# Patient Record
Sex: FEMALE | Race: BLACK OR AFRICAN AMERICAN | Employment: OTHER | URBAN - METROPOLITAN AREA
[De-identification: names, ages, dates, MRNs, and addresses within clinical notes are randomized per-mention and may not be internally consistent; named-entity substitution may affect disease eponyms.]

---

## 2017-08-03 ENCOUNTER — OFFICE VISIT (OUTPATIENT)
Dept: PRIMARY CARE CLINIC | Age: 62
End: 2017-08-03

## 2017-08-03 VITALS
TEMPERATURE: 98.3 F | HEART RATE: 65 BPM | BODY MASS INDEX: 24.44 KG/M2 | OXYGEN SATURATION: 100 % | WEIGHT: 165 LBS | SYSTOLIC BLOOD PRESSURE: 122 MMHG | HEIGHT: 69 IN | DIASTOLIC BLOOD PRESSURE: 74 MMHG

## 2017-08-03 DIAGNOSIS — R60.0 BILATERAL EDEMA OF LOWER EXTREMITY: Primary | ICD-10-CM

## 2017-08-03 DIAGNOSIS — R06.01 ORTHOPNEA: ICD-10-CM

## 2017-08-03 PROCEDURE — 99213 OFFICE O/P EST LOW 20 MIN: CPT | Performed by: NURSE PRACTITIONER

## 2017-08-03 RX ORDER — POTASSIUM CHLORIDE 750 MG/1
10 TABLET, EXTENDED RELEASE ORAL DAILY
Qty: 60 TABLET | Refills: 3 | Status: SHIPPED | OUTPATIENT
Start: 2017-08-03 | End: 2019-04-24

## 2017-08-03 RX ORDER — FUROSEMIDE 20 MG/1
40 TABLET ORAL SEE ADMIN INSTRUCTIONS
Qty: 60 TABLET | Refills: 1 | Status: SHIPPED | OUTPATIENT
Start: 2017-08-03 | End: 2019-04-24

## 2017-08-03 ASSESSMENT — ENCOUNTER SYMPTOMS
VOMITING: 0
WHEEZING: 0
COUGH: 0
ANAL BLEEDING: 0
STRIDOR: 0
DIARRHEA: 0
COLOR CHANGE: 0
RECTAL PAIN: 0
CHOKING: 0
ABDOMINAL PAIN: 0
CHEST TIGHTNESS: 0
APNEA: 0
CONSTIPATION: 0
ABDOMINAL DISTENTION: 0
BLOOD IN STOOL: 0
NAUSEA: 0

## 2017-08-03 ASSESSMENT — PATIENT HEALTH QUESTIONNAIRE - PHQ9
SUM OF ALL RESPONSES TO PHQ QUESTIONS 1-9: 0
2. FEELING DOWN, DEPRESSED OR HOPELESS: 0
1. LITTLE INTEREST OR PLEASURE IN DOING THINGS: 0
SUM OF ALL RESPONSES TO PHQ9 QUESTIONS 1 & 2: 0

## 2017-12-05 DIAGNOSIS — I10 ESSENTIAL HYPERTENSION: ICD-10-CM

## 2017-12-06 RX ORDER — DOXAZOSIN MESYLATE 4 MG/1
TABLET ORAL
Qty: 30 TABLET | Refills: 9 | Status: SHIPPED | OUTPATIENT
Start: 2017-12-06 | End: 2018-04-12 | Stop reason: SDUPTHER

## 2018-03-20 RX ORDER — AMLODIPINE BESYLATE 10 MG/1
TABLET ORAL
Qty: 30 TABLET | Refills: 10 | Status: SHIPPED | OUTPATIENT
Start: 2018-03-20 | End: 2018-04-12 | Stop reason: SDUPTHER

## 2018-04-12 DIAGNOSIS — I10 ESSENTIAL HYPERTENSION: ICD-10-CM

## 2018-04-12 RX ORDER — DOXAZOSIN MESYLATE 4 MG/1
TABLET ORAL
Qty: 30 TABLET | Refills: 9 | Status: SHIPPED | OUTPATIENT
Start: 2018-04-12 | End: 2018-11-08 | Stop reason: SDUPTHER

## 2018-04-12 RX ORDER — AMLODIPINE BESYLATE 10 MG/1
TABLET ORAL
Qty: 30 TABLET | Refills: 10 | Status: SHIPPED | OUTPATIENT
Start: 2018-04-12 | End: 2019-04-24 | Stop reason: SDUPTHER

## 2018-11-06 ENCOUNTER — TELEPHONE (OUTPATIENT)
Dept: PRIMARY CARE CLINIC | Age: 63
End: 2018-11-06

## 2018-11-06 DIAGNOSIS — I10 ESSENTIAL HYPERTENSION: ICD-10-CM

## 2018-11-07 RX ORDER — DOXAZOSIN MESYLATE 4 MG/1
TABLET ORAL
Qty: 30 TABLET | Refills: 9 | Status: CANCELLED | OUTPATIENT
Start: 2018-11-07

## 2018-11-08 DIAGNOSIS — I10 ESSENTIAL HYPERTENSION: ICD-10-CM

## 2018-11-08 RX ORDER — DOXAZOSIN MESYLATE 4 MG/1
TABLET ORAL
Qty: 30 TABLET | Refills: 9 | Status: SHIPPED | OUTPATIENT
Start: 2018-11-08 | End: 2019-04-24 | Stop reason: SDUPTHER

## 2019-04-24 ENCOUNTER — OFFICE VISIT (OUTPATIENT)
Dept: PRIMARY CARE CLINIC | Age: 64
End: 2019-04-24
Payer: COMMERCIAL

## 2019-04-24 VITALS
RESPIRATION RATE: 18 BRPM | BODY MASS INDEX: 24.4 KG/M2 | HEART RATE: 60 BPM | WEIGHT: 161 LBS | OXYGEN SATURATION: 99 % | HEIGHT: 68 IN | TEMPERATURE: 98.2 F | DIASTOLIC BLOOD PRESSURE: 76 MMHG | SYSTOLIC BLOOD PRESSURE: 100 MMHG

## 2019-04-24 DIAGNOSIS — E55.9 VITAMIN D DEFICIENCY: ICD-10-CM

## 2019-04-24 DIAGNOSIS — J45.40 MODERATE PERSISTENT ASTHMA, UNSPECIFIED WHETHER COMPLICATED: ICD-10-CM

## 2019-04-24 DIAGNOSIS — R51.9 SEVERE FRONTAL HEADACHES: ICD-10-CM

## 2019-04-24 DIAGNOSIS — I10 ESSENTIAL HYPERTENSION: ICD-10-CM

## 2019-04-24 DIAGNOSIS — G25.0 BENIGN HEAD TREMOR: ICD-10-CM

## 2019-04-24 DIAGNOSIS — Z00.00 ENCOUNTER FOR PREVENTIVE HEALTH EXAMINATION: Primary | ICD-10-CM

## 2019-04-24 DIAGNOSIS — Z01.419 WELL WOMAN EXAM: ICD-10-CM

## 2019-04-24 DIAGNOSIS — E04.1 RIGHT THYROID NODULE: ICD-10-CM

## 2019-04-24 DIAGNOSIS — H93.13 TINNITUS OF BOTH EARS: ICD-10-CM

## 2019-04-24 DIAGNOSIS — Z12.11 COLON CANCER SCREENING: ICD-10-CM

## 2019-04-24 PROCEDURE — 99396 PREV VISIT EST AGE 40-64: CPT | Performed by: INTERNAL MEDICINE

## 2019-04-24 RX ORDER — DOXAZOSIN MESYLATE 4 MG/1
TABLET ORAL
Qty: 90 TABLET | Refills: 3 | Status: SHIPPED | OUTPATIENT
Start: 2019-04-24 | End: 2019-04-29 | Stop reason: SDUPTHER

## 2019-04-24 RX ORDER — FLUTICASONE PROPIONATE 50 MCG
1 SPRAY, SUSPENSION (ML) NASAL DAILY
Qty: 1 BOTTLE | Refills: 2 | Status: SHIPPED | OUTPATIENT
Start: 2019-04-24 | End: 2020-04-06 | Stop reason: SDUPTHER

## 2019-04-24 RX ORDER — AMLODIPINE BESYLATE 10 MG/1
TABLET ORAL
Qty: 90 TABLET | Refills: 3 | Status: SHIPPED | OUTPATIENT
Start: 2019-04-24 | End: 2019-04-29 | Stop reason: SDUPTHER

## 2019-04-24 RX ORDER — ALBUTEROL SULFATE 90 UG/1
AEROSOL, METERED RESPIRATORY (INHALATION)
Qty: 1 INHALER | Refills: 5 | Status: SHIPPED | OUTPATIENT
Start: 2019-04-24 | End: 2019-05-01 | Stop reason: SDUPTHER

## 2019-04-24 ASSESSMENT — ENCOUNTER SYMPTOMS
APNEA: 0
NAUSEA: 0
RHINORRHEA: 0
RECTAL PAIN: 0
ABDOMINAL DISTENTION: 0
VOMITING: 0
EYE PAIN: 0
DIARRHEA: 0
COLOR CHANGE: 0
TROUBLE SWALLOWING: 0
CHEST TIGHTNESS: 0
SORE THROAT: 0
WHEEZING: 0
CHOKING: 0
ABDOMINAL PAIN: 0
ANAL BLEEDING: 0
CONSTIPATION: 0
COUGH: 0
SHORTNESS OF BREATH: 0
STRIDOR: 0
BLOOD IN STOOL: 0
BACK PAIN: 0

## 2019-04-24 ASSESSMENT — PATIENT HEALTH QUESTIONNAIRE - PHQ9
1. LITTLE INTEREST OR PLEASURE IN DOING THINGS: 0
SUM OF ALL RESPONSES TO PHQ QUESTIONS 1-9: 0
2. FEELING DOWN, DEPRESSED OR HOPELESS: 0
SUM OF ALL RESPONSES TO PHQ9 QUESTIONS 1 & 2: 0
SUM OF ALL RESPONSES TO PHQ QUESTIONS 1-9: 0

## 2019-04-24 NOTE — PROGRESS NOTES
Provider, MD   cetirizine (ZYRTEC) 10 MG tablet Take 10 mg by mouth daily. Yes Historical Provider, MD   potassium chloride (KLOR-CON M) 10 MEQ extended release tablet Take 1 tablet by mouth daily May take 2 on days taking 2 lasix  Phoebe Glover APRN - CNP        Allergies   Allergen Reactions    Aspirin Hives    Bimatoprost Hives    Latanoprost Other (See Comments)    Lisinopril Swelling       Past Medical History:   Diagnosis Date    Allergic rhinitis     Asthma     Hypertension     Seizures ([address]     after head trauma after passing out and hit her head.       Urticaria, idiopathic        Past Surgical History:   Procedure Laterality Date    CHOLECYSTECTOMY      COLONOSCOPY      EYE SURGERY      lasik       Social History     Socioeconomic History    Marital status:      Spouse name: Not on file    Number of children: Not on file    Years of education: Not on file    Highest education level: Not on file   Occupational History    Not on file   Social Needs    Financial resource strain: Not on file    Food insecurity:     Worry: Not on file     Inability: Not on file    Transportation needs:     Medical: Not on file     Non-medical: Not on file   Tobacco Use    Smoking status: Former Smoker     Years: 2.00     Types: Cigarettes     Last attempt to quit: 1982     Years since quittin.0    Smokeless tobacco: Never Used   Substance and Sexual Activity    Alcohol use: Yes     Comment: rarely    Drug use: No    Sexual activity: Yes     Partners: Male   Lifestyle    Physical activity:     Days per week: Not on file     Minutes per session: Not on file    Stress: Not on file   Relationships    Social connections:     Talks on phone: Not on file     Gets together: Not on file     Attends Confucianist service: Not on file     Active member of club or organization: Not on file     Attends meetings of clubs or organizations: Not on file     Relationship status: Not on file  Intimate partner violence:     Fear of current or ex partner: Not on file     Emotionally abused: Not on file     Physically abused: Not on file     Forced sexual activity: Not on file   Other Topics Concern    Not on file   Social History Narrative    Not on file        Family History   Problem Relation Age of Onset    Arthritis Mother     High Blood Pressure Mother     High Cholesterol Mother     High Blood Pressure Sister        ADVANCE DIRECTIVE: N, Not Received    Vitals:    04/24/19 0844   BP: 100/76   Pulse: 60   Resp: 18   Temp: 98.2 °F (36.8 °C)   TempSrc: Oral   SpO2: 99%   Weight: 161 lb (73 kg)   Height: 5' 8\" (1.727 m)     Estimated body mass index is 24.48 kg/m² as calculated from the following:    Height as of this encounter: 5' 8\" (1.727 m). Weight as of this encounter: 161 lb (73 kg). Physical Exam   Constitutional: She is oriented to person, place, and time. She appears well-developed and well-nourished. No distress. HENT:   Head: Normocephalic and atraumatic. Right Ear: External ear normal.   Left Ear: External ear normal.   Nose: Nose normal.   Mouth/Throat: Oropharynx is clear and moist. No oropharyngeal exudate. Eyes: Pupils are equal, round, and reactive to light. Conjunctivae and EOM are normal. Right eye exhibits no discharge. Left eye exhibits no discharge. No scleral icterus. Neck: Normal range of motion. Neck supple. No JVD present. No tracheal deviation present. No thyromegaly present. Cardiovascular: Normal rate, regular rhythm, normal heart sounds and intact distal pulses. Exam reveals no gallop. No murmur heard. Pulmonary/Chest: Effort normal. No respiratory distress. She has no wheezes. She has no rales. She exhibits no tenderness. Normal breast exam and no axillary adenopathy. Abdominal: Soft. Bowel sounds are normal. She exhibits no distension and no mass. There is no tenderness. Musculoskeletal: She exhibits no edema or tenderness.    The spine is straight. No joint abnormalities were noted. Lymphadenopathy:     She has no cervical adenopathy. Neurological: She is alert and oriented to person, place, and time. No cranial nerve deficit. She exhibits normal muscle tone. Coordination normal.   Skin: Skin is warm and dry. No rash noted. She is not diaphoretic. Eczematous patch of the upper back. Psychiatric: She has a normal mood and affect. Her behavior is normal. Judgment and thought content normal.   Nursing note and vitals reviewed. No flowsheet data found. Lab Results   Component Value Date    CHOL 165 04/11/2011    TRIG 68 04/11/2011    HDL 67 04/11/2011    LDLCALC 84 04/11/2011    GLUCOSE 86 04/11/2011       The ASCVD Risk score (Smiley Mccord., et al., 2013) failed to calculate for the following reasons:    Cannot find a previous HDL lab    Cannot find a previous total cholesterol lab    Immunization History   Administered Date(s) Administered    Hepatitis A 08/22/2011    Hepatitis B, unspecified formulation 08/22/2011    Influenza Vaccine, unspecified formulation 09/28/2015, 10/01/2016    Influenza Virus Vaccine 11/28/2011, 10/17/2013    Influenza Whole 01/13/2016    Pneumococcal Polysaccharide ([id number]) 11/18/2015    Zoster Live (Zostavax) 02/28/2016       Health Maintenance   Topic Date Due    HIV screen  05/28/1969    DTaP/Tdap/Td vaccine (1 - Tdap) 05/28/1973    Hepatitis B Vaccine (2 of 3 - Risk 3-dose series) 09/19/2011    Hepatitis A vaccine (2 of 2 - Risk 2-dose series) 02/22/2012    Potassium monitoring  04/11/2012    Creatinine monitoring  04/11/2012    Lipid screen  04/11/2016    Shingles Vaccine (2 of 3) 04/24/2016    Cervical cancer screen  12/03/2018    Flu vaccine (Season Ended) 09/01/2019    Breast cancer screen  01/31/2021    Colon cancer screen colonoscopy  02/28/2023    Pneumococcal 0-64 years Vaccine  Completed       ASSESSMENT/PLAN:   Diagnosis Orders   1.  Encounter for preventive health examination  amLODIPine (NORVASC) 10 MG tablet    HIV-1 AND HIV-2 ANTIBODIES    TSH WITH REFLEX TO FT4    Comprehensive Metabolic Panel    CBC Auto Differential    Hemoglobin A1C    Hepatitis B Core Antibody, Total    HEPATITIS B SURFACE ANTIBODY    HEPATITIS A ANTIBODY, TOTAL    Vitamin B12    Vitamin D 25 Hydroxy    Lipid Panel    Urinalysis   2. Essential hypertension is controlled on current regimen. Continue monitor renal function and electrolytes. BP Readings from Last 3 Encounters:   04/24/19 100/76   08/03/17 122/74   05/19/16 112/72    doxazosin (CARDURA) 4 MG tablet   3. Colon cancer screening demonstrated patient had a fit test) was given to patient. POCT Fecal Immunochemical Test (FIT)   4. Vitamin D deficiency on supplement monitor level to see if at goal 49-80.      11. Well woman exam needed. allergy referral given. 6. Moderate persistent asthma, unspecified whether complicated treated her immunology with somewhere. Stable. beclomethasone (QVAR) 40 MCG/ACT inhaler    albuterol sulfate HFA (VENTOLIN HFA) 108 (90 Base) MCG/ACT inhaler   7. Benign head tremor new symptom. Not aware of any other family members having. Refer to neurology. MRI Rhonda Hassan MD, Neurology, [facility]   8. Severe frontal headaches   Sent with tremors. MRI brain. MRI Rhonda Hassan MD, Neurology, [facility]   9. Tinnitus of both ears  MRI BRAIN W WO CONTRAST   10. Right thyroid nodule noted on exam will get ultrasound. US THYROID     Winnie Mann received counseling on the following healthy behaviors: medication adherence    Patient given educational materials on Nutrition    I have instructed Winnie Mann to complete a self tracking handout on Blood Pressures  and Weights and instructed them to bring it with them to her next appointment. Discussed use, benefit, and side effects of prescribed medications. Barriers to medication compliance addressed.

## 2019-04-24 NOTE — LETTER
[address]  [address]  Phone: [phone]  Fax: [phone]    Ward Umaña MD        April 24, 2019     Patient: Priya Philip   YOB: 1955   Date of Visit: 4/24/2019       To Pharmacist:    Please give Vinay Reynolds tdap. Immunization History   Administered Date(s) Administered    Hepatitis A 08/22/2011    Hepatitis B, unspecified formulation 08/22/2011    Influenza Vaccine, unspecified formulation 09/28/2015, 10/01/2016    Influenza Virus Vaccine 11/28/2011, 10/17/2013    Influenza Whole 01/13/2016    Pneumococcal Polysaccharide ([id number]) 11/18/2015    Zoster Live (Zostavax) 02/28/2016    Zoster Subunit (Shingrix) 04/04/2019     . If you have any questions or concerns, please don't hesitate to call.     Sincerely,          Ward Umaña MD

## 2019-04-25 RX ORDER — AMLODIPINE BESYLATE 10 MG/1
TABLET ORAL
Qty: 30 TABLET | Refills: 9 | Status: SHIPPED | OUTPATIENT
Start: 2019-04-25 | End: 2019-05-06

## 2019-04-29 ENCOUNTER — TELEPHONE (OUTPATIENT)
Dept: PRIMARY CARE CLINIC | Age: 64
End: 2019-04-29

## 2019-04-29 DIAGNOSIS — Z00.00 ENCOUNTER FOR PREVENTIVE HEALTH EXAMINATION: ICD-10-CM

## 2019-04-29 DIAGNOSIS — I10 ESSENTIAL HYPERTENSION: ICD-10-CM

## 2019-04-30 ASSESSMENT — PATIENT HEALTH QUESTIONNAIRE - PHQ9
2. FEELING DOWN, DEPRESSED OR HOPELESS: 0
SUM OF ALL RESPONSES TO PHQ QUESTIONS 1-9: 0
1. LITTLE INTEREST OR PLEASURE IN DOING THINGS: 0
SUM OF ALL RESPONSES TO PHQ9 QUESTIONS 1 & 2: 0
SUM OF ALL RESPONSES TO PHQ QUESTIONS 1-9: 0

## 2019-05-01 DIAGNOSIS — J45.40 MODERATE PERSISTENT ASTHMA, UNSPECIFIED WHETHER COMPLICATED: ICD-10-CM

## 2019-05-01 RX ORDER — ALBUTEROL SULFATE 90 UG/1
AEROSOL, METERED RESPIRATORY (INHALATION)
Qty: 1 INHALER | Refills: 5 | Status: SHIPPED | OUTPATIENT
Start: 2019-05-01 | End: 2022-05-25 | Stop reason: SDUPTHER

## 2019-05-06 RX ORDER — AMLODIPINE BESYLATE 10 MG/1
TABLET ORAL
Qty: 90 TABLET | Refills: 3 | Status: SHIPPED | OUTPATIENT
Start: 2019-05-06 | End: 2019-08-09

## 2019-05-06 RX ORDER — DOXAZOSIN MESYLATE 4 MG/1
TABLET ORAL
Qty: 90 TABLET | Refills: 3 | Status: SHIPPED | OUTPATIENT
Start: 2019-05-06 | End: 2019-08-09

## 2019-05-16 ENCOUNTER — TELEPHONE (OUTPATIENT)
Dept: PRIMARY CARE CLINIC | Age: 64
End: 2019-05-16

## 2019-05-16 DIAGNOSIS — I67.1: Primary | ICD-10-CM

## 2019-05-16 NOTE — TELEPHONE ENCOUNTER
[facility] -STAT [phone]  Need to speak with a MD on call     Regarding a STAT MRI REPORT    Please call [phone] ask for STAT Radiologist and then [facility]

## 2019-05-17 NOTE — TELEPHONE ENCOUNTER
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 Patient referred to Dr. Best Tao .

## 2019-05-21 ENCOUNTER — TELEPHONE (OUTPATIENT)
Dept: PRIMARY CARE CLINIC | Age: 64
End: 2019-05-21

## 2019-05-22 ENCOUNTER — TELEPHONE (OUTPATIENT)
Dept: PRIMARY CARE CLINIC | Age: 64
End: 2019-05-22

## 2019-06-02 NOTE — TELEPHONE ENCOUNTER
Patient saw neurosurgery at [facility] and is scheduled for CT to further evaluate aneurysm

## 2019-06-07 ENCOUNTER — OFFICE VISIT (OUTPATIENT)
Dept: PRIMARY CARE CLINIC | Age: 64
End: 2019-06-07
Payer: COMMERCIAL

## 2019-06-07 VITALS
DIASTOLIC BLOOD PRESSURE: 80 MMHG | TEMPERATURE: 98 F | SYSTOLIC BLOOD PRESSURE: 119 MMHG | BODY MASS INDEX: 24.94 KG/M2 | RESPIRATION RATE: 18 BRPM | HEART RATE: 75 BPM | WEIGHT: 164 LBS | OXYGEN SATURATION: 98 %

## 2019-06-07 DIAGNOSIS — I67.1 BRAIN ANEURYSM: ICD-10-CM

## 2019-06-07 DIAGNOSIS — Z78.9 IMMUNE TO HEPATITIS B: ICD-10-CM

## 2019-06-07 DIAGNOSIS — E04.1 LEFT THYROID NODULE: ICD-10-CM

## 2019-06-07 DIAGNOSIS — Z01.818 PRE-OP EXAMINATION: Primary | ICD-10-CM

## 2019-06-07 PROCEDURE — 99242 OFF/OP CONSLTJ NEW/EST SF 20: CPT | Performed by: INTERNAL MEDICINE

## 2019-06-07 ASSESSMENT — ENCOUNTER SYMPTOMS
SORE THROAT: 0
COLOR CHANGE: 0
ABDOMINAL DISTENTION: 0
SHORTNESS OF BREATH: 0
ANAL BLEEDING: 0
APNEA: 0
NAUSEA: 0
STRIDOR: 0
SINUS PRESSURE: 0
BACK PAIN: 0
COUGH: 0
RHINORRHEA: 0
BLOOD IN STOOL: 0
DIARRHEA: 0
CHEST TIGHTNESS: 0
CHOKING: 0
SINUS PAIN: 0
TROUBLE SWALLOWING: 0
WHEEZING: 0
RECTAL PAIN: 0
EYE PAIN: 0
ABDOMINAL PAIN: 0
VOMITING: 0
CONSTIPATION: 0

## 2019-06-07 ASSESSMENT — PATIENT HEALTH QUESTIONNAIRE - PHQ9
2. FEELING DOWN, DEPRESSED OR HOPELESS: 0
SUM OF ALL RESPONSES TO PHQ QUESTIONS 1-9: 0
SUM OF ALL RESPONSES TO PHQ9 QUESTIONS 1 & 2: 0
1. LITTLE INTEREST OR PLEASURE IN DOING THINGS: 0
SUM OF ALL RESPONSES TO PHQ QUESTIONS 1-9: 0

## 2019-06-07 NOTE — PROGRESS NOTES
2019    Lizbeth Avila (:  1955) is a 71 y.o. female, here for a Pre-op exam.     Patient Active Problem List   Diagnosis    Hives    Leg edema    TMJ (temporomandibular joint syndrome)    Breast mass, right    Vitamin D deficiency    Osteopenia    Candidiasis of mouth    Normal EEG    Chronic hepatitis C without hepatic coma (HCC)    Moderate persistent asthma without complication    Essential hypertension       Review of Systems   Constitutional: Negative for activity change, appetite change, diaphoresis, fatigue and fever. HENT: Negative for congestion, dental problem, drooling, ear discharge, ear pain, postnasal drip, rhinorrhea, sinus pressure, sinus pain, sneezing, sore throat, tinnitus and trouble swallowing. Eyes: Negative for pain. Respiratory: Negative for apnea, cough, choking, chest tightness, shortness of breath, wheezing and stridor. Asthma   Cardiovascular: Negative for chest pain and palpitations. Hypertension. Gastrointestinal: Negative for abdominal distention, abdominal pain, anal bleeding, blood in stool, constipation, diarrhea, nausea, rectal pain and vomiting. Negative colonoscopy in  and needs repeat in    Endocrine: Negative. Genitourinary: Negative for decreased urine volume, difficulty urinating, dyspareunia, dysuria, enuresis, flank pain, frequency, genital sores, hematuria, menstrual problem, pelvic pain, urgency, vaginal bleeding and vaginal discharge. Menarche at age 14. Age of first child 21. Patient feed two children. Menopause at age 39. No family history of breast cancer. History of normal pap. Musculoskeletal: Negative for back pain, myalgias and neck pain. Skin: Negative for color change, pallor, rash and wound. Chronic hives under good control with no recent flareups on Xolair. Neurological: Negative.   Negative for dizziness, tremors, seizures, syncope, facial asymmetry, speech difficulty, weakness, light-headedness, numbness and headaches. Episodes of altered mental status for the past few months. Patient is talking and says the wrong words. It lasts for a few seconds and resolves . It may happen a couple of times a day. No associated headache or numbness or weakness or ataxia. Recent lipids normal with LDL 95 and HDL above 50. Hematological: Negative for adenopathy. Does not bruise/bleed easily. Psychiatric/Behavioral: Negative. Negative for agitation, behavioral problems, confusion, decreased concentration, dysphoric mood, hallucinations, self-injury, sleep disturbance and suicidal ideas. The patient is not nervous/anxious and is not hyperactive. .       Prior to Visit Medications    Medication Sig Taking? Authorizing Provider   amLODIPine (NORVASC) 10 MG tablet TAKE 1 TABLET BY MOUTH ONE TIME A DAY Yes Ulysses Hermes, MD   doxazosin (CARDURA) 4 MG tablet TAKE ONE TABLET BY MOUTH ONCE NIGHTLY Yes Ulysses Hermes, MD   albuterol sulfate HFA (VENTOLIN HFA) 108 (90 Base) MCG/ACT inhaler INHALE TWO PUFFS BY MOUTH EVERY 6 HOURS AS NEEDED FOR WHEEZING Yes Ulysses Hermes, MD   beclomethasone (QVAR) 40 MCG/ACT inhaler INHALE TWO PUFFS BY MOUTH TWICE A DAY Yes Ulysses Hermes, MD   fluticasone (FLONASE) 50 MCG/ACT nasal spray 1 spray by Each Nare route daily 1 Spray in each nostril Yes Ulysses Hermes, MD   omalizumab (OMALIZUMAB) 150 MG injection Inject 150 mg into the skin every 28 days. Yes Ulysses Hermes, MD   EPINEPHrine (EPIPEN) 0.3 MG/0.3ML PATRICK injection Inject 0.3 mLs into the muscle once as needed for 1 dose. Yes Ulysses Hermes, MD   timolol (TIMOPTIC) 0.5 % ophthalmic solution Place 1 drop into both eyes daily. Yes Historical Provider, MD   Calcium Carbonate-Vitamin D (CALCIUM 600 + D) 600-400 MG-UNIT TABS Take  by mouth daily. Yes Historical Provider, MD   Multiple Vitamin (MULTIVITAMIN PO) Take  by mouth daily. Yes Historical Provider, MD   Cholecalciferol (VITAMIN D3) 5000 UNITS CAPS Take 1 capsule by mouth daily. Yes Enrique Hill MD   ranitidine (ZANTAC) 150 MG capsule Take 150 mg by mouth 2 times daily. Yes Historical Provider, MD   cetirizine (ZYRTEC) 10 MG tablet Take 10 mg by mouth daily. Yes Historical Provider, MD        Allergies   Allergen Reactions    Aspirin Hives    Bimatoprost Hives    Latanoprost Other (See Comments)    Lisinopril Swelling    Macadamia Nut Oil      HIVES    Salicylates      HIVES       Past Medical History:   Diagnosis Date    Allergic rhinitis     Asthma     Hypertension     Seizures ([address]     after head trauma after passing out and hit her head.       Urticaria, idiopathic        Past Surgical History:   Procedure Laterality Date    CHOLECYSTECTOMY      COLONOSCOPY      EYE SURGERY      lasik       Social History     Socioeconomic History    Marital status:      Spouse name: Not on file    Number of children: Not on file    Years of education: Not on file    Highest education level: Not on file   Occupational History    Not on file   Social Needs    Financial resource strain: Not on file    Food insecurity:     Worry: Not on file     Inability: Not on file    Transportation needs:     Medical: Not on file     Non-medical: Not on file   Tobacco Use    Smoking status: Former Smoker     Years: 2.00     Types: Cigarettes     Last attempt to quit: 1982     Years since quittin.1    Smokeless tobacco: Never Used   Substance and Sexual Activity    Alcohol use: Yes     Comment: rarely    Drug use: No    Sexual activity: Yes     Partners: Male   Lifestyle    Physical activity:     Days per week: Not on file     Minutes per session: Not on file    Stress: Not on file   Relationships    Social connections:     Talks on phone: Not on file     Gets together: Not on file     Attends Mu-ism service: Not on file     Active member of club or organization: Not on file     Attends meetings of clubs or organizations: Not on file     Relationship status: Not on file    Intimate partner violence:     Fear of current or ex partner: Not on file     Emotionally abused: Not on file     Physically abused: Not on file     Forced sexual activity: Not on file   Other Topics Concern    Not on file   Social History Narrative    Not on file        Family History   Problem Relation Age of Onset    Arthritis Mother     High Blood Pressure Mother     High Cholesterol Mother     High Blood Pressure Sister        ADVANCE DIRECTIVE: N, Not Received    Vitals:    06/07/19 1435   BP: 119/80   Pulse: 75   Resp: 18   Temp: 98 °F (36.7 °C)   TempSrc: Oral   SpO2: 98%   Weight: 164 lb (74.4 kg)     Estimated body mass index is 24.94 kg/m² as calculated from the following:    Height as of 4/24/19: 5' 8\" (1.727 m). Weight as of this encounter: 164 lb (74.4 kg). Physical Exam   Constitutional: She is oriented to person, place, and time. She appears well-developed and well-nourished. No distress. HENT:   Head: Normocephalic and atraumatic. Right Ear: External ear normal.   Left Ear: External ear normal.   Nose: Nose normal.   Mouth/Throat: Oropharynx is clear and moist. No oropharyngeal exudate. Eyes: Pupils are equal, round, and reactive to light. Conjunctivae and EOM are normal. Right eye exhibits no discharge. Left eye exhibits no discharge. No scleral icterus. Neck: Normal range of motion. Neck supple. No JVD present. No tracheal deviation present. No thyromegaly present. Cardiovascular: Normal rate, regular rhythm, normal heart sounds and intact distal pulses. Exam reveals no gallop. No murmur heard. Pulmonary/Chest: Effort normal. No respiratory distress. She has no wheezes. She has no rales. She exhibits no tenderness. Normal breast exam and no axillary adenopathy. Abdominal: Soft. Bowel sounds are normal. She exhibits no distension and no mass. Hospital  Component Name Value Ref Range   Protime 11.9   Comment:  Effective 5/3/2019, there is a new lot number of PT reagent. There is no change to the reference range or critical   value. 9.8 - 13.2 seconds   INR 1.0   Comment:  RECOMMENDED THERAPEUTIC RANGES USING INR :    Stable Oral Anticoagulant Therapy:         2.0 - 3.0    Mechanical Prosthetic Heart Valve:         2.5 - 3.5    Recurrent Acute Myocardial Infarction:    2.5 - 3.5 0.9 - 1.1    Specimen Collected on   PLASMA 5/22/2019 4:30 PM     Care Everywhere Result Report  HGB, A1C (GLYCOHEMOGLOBIN)Resulted: 5/17/2019 8:55 PM  [facility]  Component Name Value Ref Range   Hgb A1C 5.4   Comment:  Reference Ranges for Hgb A1c:  Increased risk for diabetes:  5.7-6.4%  Probable diabetes: >=6.5%  Desired control for previously diagnosed diabetics: <7.0%    Many conditions can affect the Hgb A1c value including hemolysis, recent transfusion, hemoglobin variants,   thalassemias, severe chronic hepatic and renal disease and iron deficiency among others.  Please note that results   from this assay are invalid for patients with abnormal amounts of Fetal Hemoglobin (HbF).   Results must be   interpreted in the proper clinical context.  This method is certified by the [employer] program ([employer]) and [facility]able to [facility].  4 - 5.6 %     Care Everywhere Result Report  DIFFERENTIALResulted: 5/17/2019 8:46 PM  [facility]  Component Name Value Ref Range   Neutrophils Relative 62.0 %   Lymphocytes Relative 31.0 %   Atypical Lymphocytes 2.0   Comment: Atypical lymphocyte(s)= reactive, benign, viral lymphocyte(s) 0 - 9 %   Monocytes Relative 5.0 %   Neutrophils Absolute 1.7 1.5 - 7.8 10*3/uL   Lymphocytes Absolute 0.9 0.8 - 3.9 10*3/uL   Monocytes Absolute 0.1 (L) 0.2 - 0.9 10*3/uL   Giant Platelets Present     Specimen Collected on   WHOLE BLOOD 5/17/2019 4:38 PM   Result Narrative   RBC morphology appears normal.     Care Everywhere Result Report  HEPATITIS A IGG ANTIBODYLast Updated: 11/3/2015  [facility]  Component Name Value Ref Range   Hepatitis A IgG Antibody Reactive (A)   Comment:  IgG anti-HAV detected. The presence of IgG anti-HAV implies past infection (recent or distant) or vaccination   against HAV. Detectable levels above the assay cut-off suggest immunity to HAV infection. Nonreactive    Specimen Collected on   SERUM Unknown     Immune to hepatitis B. Care Everywhere Result Report  HEPATITIS B SURFACE ABResulted: 5/17/2019 9:04 PM  [facility]  Component Name Value Ref Range   Hep B S Ab Reactive (A)   Comment:  IgG anti-HBs detected. Presumptive evidence of HBV infection or immunization. Individual is considered immune to   HBV infection. Nonreactive    Specimen Collected on   SERUM 5/17/2019 4:38 PM     Care Everywhere Result Report  HIV AG/AB COMBO ASSAYResulted: 5/17/2019 9:07 PM  [facility]  Component Name Value Ref Range   HIV Ag/Ab Screen Nonreactive   Comment: HIV-1 p24 Ag and HIV-1/HIV-2 Ab not detected. Nonreactive    Specimen Collected on   SERUM 5/17/2019 4:38 PM       B 12 level  1,067, reduce dosage from daily to twice a week.       Care Everywhere Result Report  LIPID PROFILEResulted: 5/17/2019 8:50 PM  [facility]  Component Name Value Ref Range   Chol/HDL Ratio 2.5 0 - 5    Cholesterol 155   Comment:  TOTAL CHOLESTEROL INTERPRETATION:  Less than 200 mg/dL Desireable  200-239 mg/dL Borderline  Greater or Equal to 240 mg/dL High 125 - 199 mg/dL   LDL Calculated 86   Comment:  LDL CHOLESTEROL INTERPRETATION:    Less than 100 mg/dL Optimal  100-129 mg/dL Near optimal/above optimal  130-159 mg/dL Borderline High  160-189 mg/dL High  Greater or Equal to 190 mg/dL Very High 0 - 100 mg/dL   HDL 63   Comment:  HDL CHOLESTEROL INTERPRETATION:    Less than 40 mg/dL Low  Greater than 60 mg/dL Desirable 40 - 180 mg/dL   Triglycerides 32   Comment:  TOTAL TRIGLYCERIDE INTERPRETATION:    Less than 150 mg/dL Normal  150-199 mg/dL Borderline HIgh  200-499 mg/dL High  Greater or Equal to 500 mg/dL Very High 0 - 150 mg/dL   Specimen Collected on   Care Everywhere Result Report  VITAMIN D 25 HYDROXY TOTALResulted: 5/17/2019 9:06 PM  [facility]  Component Name Value Ref Range   Vit D, 25-Hydroxy 66   Comment:  Therapy is based on measurement of Total 25-OHD, with levels <20 ng/mL indicative of Vitamin D deficiency, while   levels between 20 ng/mL and 30 ng/mL suggest insufficiency.  Sufficient levels are >or = 30 ng/mL. 30 - 80 ng/mL   Specimen Collected on   SERUM      TSH 3.22 , normal on 5/17/19    Completed cure for Hepatitis C and non detected. Immunization History   Administered Date(s) Administered    Hepatitis A 08/22/2011    Hepatitis B, unspecified formulation 08/22/2011    Influenza Vaccine, unspecified formulation 09/28/2015, 10/01/2016    Influenza Virus Vaccine 11/28/2011, 10/17/2013    Influenza Whole 01/13/2016    Pneumococcal Polysaccharide ([id number]) 11/18/2015    Zoster Live (Zostavax) 02/28/2016    Zoster Subunit (Shingrix) 04/04/2019       Health Maintenance   Topic Date Due    HIV screen  05/28/1969    DTaP/Tdap/Td vaccine (1 - Tdap) 05/28/1973    Hepatitis B Vaccine (2 of 3 - Risk 3-dose series) 09/19/2011    Hepatitis A vaccine (2 of 2 - Risk 2-dose series) 02/22/2012    Potassium monitoring  04/11/2012    Creatinine monitoring  04/11/2012    Lipid screen  04/11/2016    Cervical cancer screen  12/03/2018    Pneumococcal 65+ years Vaccine (1 of 2 - PCV13) 05/28/2019    Shingles Vaccine (3 of 3) 05/30/2019    Flu vaccine (Season Ended) 09/01/2019    Breast cancer screen  01/31/2021    Colon cancer screen colonoscopy  02/28/2023    DEXA (modify frequency per FRAX score)  Completed       ASSESSMENT/PLAN:   Diagnosis Orders   1. Pre-op examination     2. Immune to hepatitis B     3.  Left thyroid nodule  External Referral To ENT 4. Brain aneurysm       Stable for planned procedure. Cedric Petersen received counseling on the following healthy behaviors: medication adherence    Patient given educational materials on After visit summary with diagnosis and treatment plan. I have instructed Cedric Petersen to complete a self tracking handout on Weights blood pressure and instructed them to bring it with them to her next appointment. Discussed use, benefit, and side effects of prescribed medications. Barriers to medication compliance addressed. All patient questions answered. Pt voiced understanding. Patient is taking over the counter meds and discussed as to how they interact with prescription medications. An electronic signature was used to authenticate this note.     --Delphine Vogel MD on 6/7/2019 at 3:09 PM

## 2019-08-09 ENCOUNTER — OFFICE VISIT (OUTPATIENT)
Dept: PRIMARY CARE CLINIC | Age: 64
End: 2019-08-09
Payer: COMMERCIAL

## 2019-08-09 VITALS
RESPIRATION RATE: 18 BRPM | SYSTOLIC BLOOD PRESSURE: 117 MMHG | WEIGHT: 161 LBS | OXYGEN SATURATION: 98 % | HEART RATE: 56 BPM | DIASTOLIC BLOOD PRESSURE: 72 MMHG | BODY MASS INDEX: 24.48 KG/M2 | TEMPERATURE: 99 F

## 2019-08-09 DIAGNOSIS — J45.40 MODERATE PERSISTENT ASTHMA WITHOUT COMPLICATION: ICD-10-CM

## 2019-08-09 DIAGNOSIS — Z29.8 SEIZURE PROPHYLAXIS: ICD-10-CM

## 2019-08-09 DIAGNOSIS — E55.9 VITAMIN D DEFICIENCY: ICD-10-CM

## 2019-08-09 DIAGNOSIS — Z11.59 NEED FOR HEPATITIS B SCREENING TEST: ICD-10-CM

## 2019-08-09 DIAGNOSIS — R35.0 URINARY FREQUENCY: ICD-10-CM

## 2019-08-09 DIAGNOSIS — J30.1 SEASONAL ALLERGIC RHINITIS DUE TO POLLEN: ICD-10-CM

## 2019-08-09 DIAGNOSIS — I10 ESSENTIAL HYPERTENSION: Primary | ICD-10-CM

## 2019-08-09 DIAGNOSIS — Z23 NEED FOR VACCINATION AGAINST STREPTOCOCCUS PNEUMONIAE: ICD-10-CM

## 2019-08-09 DIAGNOSIS — Z28.39 IMMUNIZATION DEFICIENCY: ICD-10-CM

## 2019-08-09 PROCEDURE — 99214 OFFICE O/P EST MOD 30 MIN: CPT | Performed by: INTERNAL MEDICINE

## 2019-08-09 PROCEDURE — 90670 PCV13 VACCINE IM: CPT | Performed by: INTERNAL MEDICINE

## 2019-08-09 PROCEDURE — 90471 IMMUNIZATION ADMIN: CPT | Performed by: INTERNAL MEDICINE

## 2019-08-09 RX ORDER — LEVETIRACETAM 500 MG/1
500 TABLET ORAL 2 TIMES DAILY
COMMUNITY
End: 2019-09-13 | Stop reason: ALTCHOICE

## 2019-08-09 RX ORDER — FAMOTIDINE 20 MG/1
20 TABLET, FILM COATED ORAL 2 TIMES DAILY
COMMUNITY
End: 2019-09-13

## 2019-08-09 RX ORDER — OXYCODONE HYDROCHLORIDE AND ACETAMINOPHEN 5; 325 MG/1; MG/1
1 TABLET ORAL EVERY 4 HOURS PRN
COMMUNITY
End: 2020-07-07

## 2019-08-09 RX ORDER — CEFUROXIME AXETIL 250 MG/1
250 TABLET ORAL 2 TIMES DAILY
COMMUNITY
End: 2019-09-13 | Stop reason: ALTCHOICE

## 2019-08-09 ASSESSMENT — ENCOUNTER SYMPTOMS
RECTAL PAIN: 0
ABDOMINAL DISTENTION: 0
SINUS PRESSURE: 0
CHEST TIGHTNESS: 0
COUGH: 0
SORE THROAT: 0
CHOKING: 0
DIARRHEA: 0
SWOLLEN GLANDS: 0
EYE PAIN: 0
BLOOD IN STOOL: 0
APNEA: 0
SHORTNESS OF BREATH: 0
STRIDOR: 0
BACK PAIN: 0
WHEEZING: 0
VOMITING: 0
SINUS PAIN: 0
RHINORRHEA: 0
NAUSEA: 0
TROUBLE SWALLOWING: 0
COLOR CHANGE: 0
ANAL BLEEDING: 0
VISUAL CHANGE: 0
ABDOMINAL PAIN: 0
CONSTIPATION: 0

## 2019-08-09 NOTE — PROGRESS NOTES
Margret Alarcon MD   ranitidine (ZANTAC) 150 MG capsule Take 150 mg by mouth 2 times daily. Historical Provider, MD   cetirizine (ZYRTEC) 10 MG tablet Take 10 mg by mouth daily. Historical Provider, MD        Social History     Tobacco Use    Smoking status: Former Smoker     Years: 2.00     Types: Cigarettes     Last attempt to quit: 1982     Years since quittin.3    Smokeless tobacco: Never Used   Substance Use Topics    Alcohol use: Yes     Comment: rarely        Vitals:    19 1118   BP: 117/72   Pulse: 56   Resp: 18   Temp: 99 °F (37.2 °C)   TempSrc: Oral   SpO2: 98%   Weight: 161 lb (73 kg)     Estimated body mass index is 24.48 kg/m² as calculated from the following:    Height as of 19: 5' 8\" (1.727 m). Weight as of this encounter: 161 lb (73 kg). Physical Exam   Constitutional: She is oriented to person, place, and time. She appears well-developed and well-nourished. No distress. HENT:   Head: Normocephalic and atraumatic. Right Ear: External ear normal.   Left Ear: External ear normal.   Nose: Nose normal.   Mouth/Throat: Oropharynx is clear and moist. No oropharyngeal exudate. Eyes: Pupils are equal, round, and reactive to light. Conjunctivae and EOM are normal. Right eye exhibits no discharge. Left eye exhibits no discharge. No scleral icterus. Neck: Normal range of motion. Neck supple. No JVD present. No tracheal deviation present. No thyromegaly present. Cardiovascular: Normal rate, regular rhythm, normal heart sounds and intact distal pulses. Exam reveals no gallop. No murmur heard. Pulmonary/Chest: Effort normal. No respiratory distress. She has no wheezes. She has no rales. She exhibits no tenderness. Normal breast exam and no axillary adenopathy. Abdominal: Soft. Bowel sounds are normal. She exhibits no distension and no mass. There is no tenderness. Musculoskeletal: She exhibits no edema or tenderness. The spine is straight.  No joint

## 2019-09-13 ENCOUNTER — OFFICE VISIT (OUTPATIENT)
Dept: PRIMARY CARE CLINIC | Age: 64
End: 2019-09-13
Payer: COMMERCIAL

## 2019-09-13 VITALS
RESPIRATION RATE: 18 BRPM | OXYGEN SATURATION: 97 % | WEIGHT: 163 LBS | DIASTOLIC BLOOD PRESSURE: 90 MMHG | BODY MASS INDEX: 24.78 KG/M2 | SYSTOLIC BLOOD PRESSURE: 164 MMHG | TEMPERATURE: 97.5 F | HEART RATE: 60 BPM

## 2019-09-13 DIAGNOSIS — J45.40 MODERATE PERSISTENT ASTHMA WITHOUT COMPLICATION: ICD-10-CM

## 2019-09-13 DIAGNOSIS — F51.01 PRIMARY INSOMNIA: ICD-10-CM

## 2019-09-13 DIAGNOSIS — E55.9 VITAMIN D DEFICIENCY: ICD-10-CM

## 2019-09-13 DIAGNOSIS — I10 ESSENTIAL HYPERTENSION: Primary | ICD-10-CM

## 2019-09-13 PROCEDURE — 99214 OFFICE O/P EST MOD 30 MIN: CPT | Performed by: INTERNAL MEDICINE

## 2019-09-13 RX ORDER — AMLODIPINE BESYLATE 2.5 MG/1
2.5 TABLET ORAL DAILY
Qty: 30 TABLET | Refills: 5 | Status: SHIPPED | OUTPATIENT
Start: 2019-09-13 | End: 2019-10-11

## 2019-09-13 RX ORDER — ALBUTEROL SULFATE 90 UG/1
2 AEROSOL, METERED RESPIRATORY (INHALATION) EVERY 6 HOURS PRN
Qty: 1 INHALER | Refills: 3
Start: 2019-09-13 | End: 2020-04-06 | Stop reason: SDUPTHER

## 2019-09-13 RX ORDER — MONTELUKAST SODIUM 10 MG/1
10 TABLET ORAL DAILY
Qty: 30 TABLET | Refills: 3
Start: 2019-09-13 | End: 2020-05-06 | Stop reason: SDUPTHER

## 2019-09-13 ASSESSMENT — ENCOUNTER SYMPTOMS
CHOKING: 0
ANAL BLEEDING: 0
RECTAL PAIN: 0
SINUS PRESSURE: 0
CHEST TIGHTNESS: 0
APNEA: 0
COLOR CHANGE: 0
EYE PAIN: 0
BLURRED VISION: 0
BACK PAIN: 0
WHEEZING: 0
BLOOD IN STOOL: 0
SHORTNESS OF BREATH: 0
ORTHOPNEA: 0
VOMITING: 0
COUGH: 0
RHINORRHEA: 0
SORE THROAT: 0
CONSTIPATION: 0
DIARRHEA: 0
TROUBLE SWALLOWING: 0
ABDOMINAL PAIN: 0
STRIDOR: 0
NAUSEA: 0
SINUS PAIN: 0
ABDOMINAL DISTENTION: 0

## 2019-09-13 NOTE — PROGRESS NOTES
Height as of 4/24/19: 5' 8\" (1.727 m). Weight as of this encounter: 163 lb (73.9 kg). Physical Exam   Constitutional: She is oriented to person, place, and time. She appears well-developed and well-nourished. No distress. HENT:   Head: Normocephalic and atraumatic. Right Ear: External ear normal.   Left Ear: External ear normal.   Nose: Nose normal.   Mouth/Throat: Oropharynx is clear and moist. No oropharyngeal exudate. Eyes: Pupils are equal, round, and reactive to light. Conjunctivae and EOM are normal. Right eye exhibits no discharge. Left eye exhibits no discharge. No scleral icterus. Neck: Normal range of motion. Neck supple. No JVD present. No tracheal deviation present. No thyromegaly present. Cardiovascular: Normal rate, regular rhythm, normal heart sounds and intact distal pulses. Exam reveals no gallop. No murmur heard. Pulmonary/Chest: Effort normal. No respiratory distress. She has no wheezes. She has no rales. She exhibits no tenderness. Abdominal: Soft. Bowel sounds are normal. She exhibits no distension and no mass. There is no tenderness. Musculoskeletal: She exhibits no edema or tenderness. The spine is straight. No joint abnormalities were noted. Lymphadenopathy:     She has no cervical adenopathy. Neurological: She is alert and oriented to person, place, and time. No cranial nerve deficit. She exhibits normal muscle tone. Coordination normal.   Expressive aphasia, mild. Mildly ataxic gait. IT is much improved from initally after surgery. Skin: Skin is warm and dry. No rash noted. She is not diaphoretic. Psychiatric: She has a normal mood and affect. Her behavior is normal. Judgment and thought content normal.   Nursing note and vitals reviewed. ASSESSMENT/PLAN:   Diagnosis Orders   1. Essential hypertension blood pressure is also elevated 140/95 a week ago at the neurosurgeons office. Will restart amlodipine 2.5 mg daily.   BP Readings from Last 3

## 2019-10-08 LAB
25-HYDROXYVITAMIN D2 AND D3: 76 NG/ML (ref 30–80)
A/G RATIO: 1.4 (ref 1–2.1)
ALBUMIN SERPL-MCNC: 4.3 G/DL (ref 3.5–5)
ALP BLD-CCNC: 77 U/L (ref 33–140)
ALT SERPL-CCNC: 10 U/L (ref 0–40)
ANION GAP SERPL CALCULATED.3IONS-SCNC: 8 MMOL/L (ref 5–13)
AST SERPL-CCNC: 20 U/L (ref 0–30)
BASOPHILS ABSOLUTE: 0 10*3/UL (ref 0–0.2)
BASOPHILS RELATIVE PERCENT: 0.2 %
BILIRUB SERPL-MCNC: 0.5 MG/DL (ref 0.2–1.2)
BILIRUBIN URINE: NEGATIVE
BUN / CREAT RATIO: 20
BUN BLDV-MCNC: 16 MG/DL (ref 7–25)
CALCIUM SERPL-MCNC: 9.2 MG/DL (ref 8.4–10.5)
CHLORIDE BLD-SCNC: 107 MMOL/L (ref 98–110)
CLARITY: ABNORMAL
CO2: 28 MMOL/L (ref 22–29)
COLOR: YELLOW
CREAT SERPL-MCNC: 0.79 MG/DL (ref 0.5–1.2)
EOSINOPHILS ABSOLUTE: 0 10*3/UL (ref 0–0.5)
EOSINOPHILS RELATIVE PERCENT: 0 %
EPITHELIAL CELLS, UA: ABNORMAL /HPF (ref 0–5)
ERYTHROCYTES URINE: NEGATIVE
GFR AFRICAN AMERICAN: 89 SEE NOTE
GFR NON-AFRICAN AMERICAN: 73 SEE NOTE
GLOBULIN: 3.1 G/DL (ref 2–3.7)
GLUCOSE BLD-MCNC: 86 MG/DL (ref 70–99)
GLUCOSE URINE: NEGATIVE MG/DL
GRANULOCYTE ABSOLUTE COUNT: 1.7 10*3/UL (ref 1.5–7.8)
HCT VFR BLD CALC: 35.3 % (ref 35–45)
HEMOGLOBIN: 11.4 G/DL (ref 11.7–15.5)
KETONES, URINE: NEGATIVE MG/DL
LEUKOCYTE ESTERASE, URINE: NEGATIVE
LEUKOCYTES, UA: 1 /HPF (ref 0–5)
LYMPHOCYTES ABSOLUTE: 1.1 10*3/UL (ref 0.8–3.9)
LYMPHOCYTES RELATIVE PERCENT: 36 %
MCH RBC QN AUTO: 24.8 PG (ref 27–33)
MCHC RBC AUTO-ENTMCNC: 32.2 G/DL (ref 32–36)
MCV RBC AUTO: 77.2 FL (ref 80–100)
MONOCYTES ABSOLUTE: 0.4 10*3/UL (ref 0.2–0.9)
MONOCYTES RELATIVE PERCENT: 11.8 %
MUCUS: PRESENT /HPF
NITRITE, URINE: NEGATIVE
PDW BLD-RTO: 15.1 % (ref 11–15)
PH UA: 6 (ref 5–8)
PLATELET # BLD: 268 10*3/UL (ref 140–400)
PMV BLD AUTO: 9 FL (ref 7.5–11.5)
POTASSIUM SERPL-SCNC: 3.8 MMOL/L (ref 3.5–5.1)
PROTEIN UA: NEGATIVE MG/DL
RBC # BLD: 4.58 10*6/UL (ref 3.8–5.1)
RBC UA: 5 /HPF (ref 0–3)
SEGMENTED NEUTROPHILS RELATIVE PERCENT: 52 %
SODIUM BLD-SCNC: 143 MMOL/L (ref 135–146)
SPECIFIC GRAVITY UA: 1.02 (ref 1–1.03)
TOTAL PROTEIN: 7.4 G/DL (ref 6–8)
TSH ULTRASENSITIVE: 4.43 UIU/ML (ref 0.35–4.94)
UROBILINOGEN, URINE: <2 MG/DL
WBC # BLD: 3.2 10*3/UL (ref 3.8–10.8)

## 2019-10-11 ENCOUNTER — OFFICE VISIT (OUTPATIENT)
Dept: PRIMARY CARE CLINIC | Age: 64
End: 2019-10-11
Payer: COMMERCIAL

## 2019-10-11 VITALS
DIASTOLIC BLOOD PRESSURE: 86 MMHG | SYSTOLIC BLOOD PRESSURE: 140 MMHG | OXYGEN SATURATION: 100 % | HEART RATE: 63 BPM | BODY MASS INDEX: 24.33 KG/M2 | TEMPERATURE: 97.4 F | WEIGHT: 160 LBS

## 2019-10-11 DIAGNOSIS — I10 ESSENTIAL HYPERTENSION: ICD-10-CM

## 2019-10-11 DIAGNOSIS — F51.01 PRIMARY INSOMNIA: Primary | ICD-10-CM

## 2019-10-11 PROCEDURE — 90471 IMMUNIZATION ADMIN: CPT | Performed by: INTERNAL MEDICINE

## 2019-10-11 PROCEDURE — 90662 IIV NO PRSV INCREASED AG IM: CPT | Performed by: INTERNAL MEDICINE

## 2019-10-11 PROCEDURE — 99213 OFFICE O/P EST LOW 20 MIN: CPT | Performed by: INTERNAL MEDICINE

## 2019-10-11 RX ORDER — AMLODIPINE BESYLATE 5 MG/1
5 TABLET ORAL DAILY
Qty: 30 TABLET | Refills: 5 | Status: SHIPPED | OUTPATIENT
Start: 2019-10-11 | End: 2020-04-06 | Stop reason: SDUPTHER

## 2019-10-11 RX ORDER — AMITRIPTYLINE HYDROCHLORIDE 10 MG/1
10 TABLET, FILM COATED ORAL NIGHTLY
Qty: 30 TABLET | Refills: 5 | Status: SHIPPED | OUTPATIENT
Start: 2019-10-11 | End: 2020-01-13 | Stop reason: SINTOL

## 2019-10-11 RX ORDER — RANITIDINE 150 MG/1
CAPSULE ORAL 2 TIMES DAILY
COMMUNITY
End: 2019-10-11

## 2019-10-11 RX ORDER — CETIRIZINE HYDROCHLORIDE 10 MG/1
20 TABLET ORAL DAILY
COMMUNITY
End: 2020-03-31 | Stop reason: SDUPTHER

## 2019-10-11 ASSESSMENT — ENCOUNTER SYMPTOMS
ABDOMINAL DISTENTION: 0
COUGH: 0
CONSTIPATION: 0
NAUSEA: 0
ABDOMINAL PAIN: 0
CHEST TIGHTNESS: 0
APNEA: 0
CHOKING: 0
ANAL BLEEDING: 0
VOMITING: 0
BLOOD IN STOOL: 0
WHEEZING: 0
STRIDOR: 0
RHINORRHEA: 0
SORE THROAT: 0
TROUBLE SWALLOWING: 0
COLOR CHANGE: 0
SINUS PRESSURE: 0
SINUS PAIN: 0
EYE PAIN: 0
DIARRHEA: 0
RECTAL PAIN: 0
SHORTNESS OF BREATH: 0
BACK PAIN: 0

## 2019-10-11 ASSESSMENT — PATIENT HEALTH QUESTIONNAIRE - PHQ9
1. LITTLE INTEREST OR PLEASURE IN DOING THINGS: 0
SUM OF ALL RESPONSES TO PHQ QUESTIONS 1-9: 0
2. FEELING DOWN, DEPRESSED OR HOPELESS: 0
SUM OF ALL RESPONSES TO PHQ QUESTIONS 1-9: 0
SUM OF ALL RESPONSES TO PHQ9 QUESTIONS 1 & 2: 0

## 2019-11-08 ENCOUNTER — OFFICE VISIT (OUTPATIENT)
Dept: PRIMARY CARE CLINIC | Age: 64
End: 2019-11-08
Payer: COMMERCIAL

## 2019-11-08 VITALS
TEMPERATURE: 98.3 F | SYSTOLIC BLOOD PRESSURE: 134 MMHG | WEIGHT: 161 LBS | HEART RATE: 63 BPM | BODY MASS INDEX: 24.48 KG/M2 | OXYGEN SATURATION: 99 % | DIASTOLIC BLOOD PRESSURE: 82 MMHG

## 2019-11-08 DIAGNOSIS — I10 ESSENTIAL HYPERTENSION: ICD-10-CM

## 2019-11-08 DIAGNOSIS — L20.82 FLEXURAL ECZEMA: Primary | ICD-10-CM

## 2019-11-08 PROCEDURE — 99213 OFFICE O/P EST LOW 20 MIN: CPT | Performed by: INTERNAL MEDICINE

## 2019-11-08 RX ORDER — DIPHENHYDRAMINE HCL 25 MG
2 CAPSULE ORAL
COMMUNITY
End: 2020-07-07

## 2019-11-08 RX ORDER — MOMETASONE FUROATE 1 MG/G
CREAM TOPICAL
Qty: 15 G | Refills: 3 | Status: SHIPPED | OUTPATIENT
Start: 2019-11-08 | End: 2021-01-05

## 2019-11-08 ASSESSMENT — ENCOUNTER SYMPTOMS
SINUS PRESSURE: 0
RECTAL PAIN: 0
STRIDOR: 0
DIARRHEA: 0
APNEA: 0
EYE PAIN: 0
WHEEZING: 0
RHINORRHEA: 0
BLOOD IN STOOL: 0
TROUBLE SWALLOWING: 0
COLOR CHANGE: 0
BACK PAIN: 0
ABDOMINAL PAIN: 0
CHEST TIGHTNESS: 0
NAUSEA: 0
ABDOMINAL DISTENTION: 0
COUGH: 0
CHOKING: 0
SHORTNESS OF BREATH: 0
VOMITING: 0
ANAL BLEEDING: 0
CONSTIPATION: 0
SINUS PAIN: 0
SORE THROAT: 0

## 2019-11-08 ASSESSMENT — PATIENT HEALTH QUESTIONNAIRE - PHQ9
SUM OF ALL RESPONSES TO PHQ QUESTIONS 1-9: 0
SUM OF ALL RESPONSES TO PHQ9 QUESTIONS 1 & 2: 0
1. LITTLE INTEREST OR PLEASURE IN DOING THINGS: 0
2. FEELING DOWN, DEPRESSED OR HOPELESS: 0
SUM OF ALL RESPONSES TO PHQ QUESTIONS 1-9: 0

## 2019-11-10 ASSESSMENT — PATIENT HEALTH QUESTIONNAIRE - PHQ9
SUM OF ALL RESPONSES TO PHQ9 QUESTIONS 1 & 2: 0
2. FEELING DOWN, DEPRESSED OR HOPELESS: 0
1. LITTLE INTEREST OR PLEASURE IN DOING THINGS: 0
SUM OF ALL RESPONSES TO PHQ QUESTIONS 1-9: 0
SUM OF ALL RESPONSES TO PHQ QUESTIONS 1-9: 0

## 2019-11-10 ASSESSMENT — ENCOUNTER SYMPTOMS
ORTHOPNEA: 0
BLURRED VISION: 0

## 2020-01-13 ENCOUNTER — OFFICE VISIT (OUTPATIENT)
Dept: PRIMARY CARE CLINIC | Age: 65
End: 2020-01-13
Payer: COMMERCIAL

## 2020-01-13 VITALS
BODY MASS INDEX: 23.4 KG/M2 | HEIGHT: 69 IN | RESPIRATION RATE: 18 BRPM | HEART RATE: 63 BPM | OXYGEN SATURATION: 98 % | DIASTOLIC BLOOD PRESSURE: 84 MMHG | TEMPERATURE: 98.1 F | SYSTOLIC BLOOD PRESSURE: 132 MMHG | WEIGHT: 158 LBS

## 2020-01-13 PROCEDURE — 99214 OFFICE O/P EST MOD 30 MIN: CPT | Performed by: INTERNAL MEDICINE

## 2020-01-13 RX ORDER — CITALOPRAM 20 MG/1
20 TABLET ORAL DAILY
Qty: 30 TABLET | Refills: 5 | Status: SHIPPED | OUTPATIENT
Start: 2020-01-13 | End: 2020-04-06 | Stop reason: SDUPTHER

## 2020-01-13 ASSESSMENT — ENCOUNTER SYMPTOMS
COUGH: 0
SHORTNESS OF BREATH: 1
VOMITING: 0
BACK PAIN: 0
ABDOMINAL PAIN: 0
SPUTUM PRODUCTION: 0
ORTHOPNEA: 0
HEMOPTYSIS: 0
NAUSEA: 0

## 2020-01-13 ASSESSMENT — PATIENT HEALTH QUESTIONNAIRE - PHQ9
DEPRESSION UNABLE TO ASSESS: FUNCTIONAL CAPACITY MOTIVATION LIMITS ACCURACY
1. LITTLE INTEREST OR PLEASURE IN DOING THINGS: 0
SUM OF ALL RESPONSES TO PHQ QUESTIONS 1-9: 0
SUM OF ALL RESPONSES TO PHQ QUESTIONS 1-9: 0
SUM OF ALL RESPONSES TO PHQ9 QUESTIONS 1 & 2: 0
2. FEELING DOWN, DEPRESSED OR HOPELESS: 0

## 2020-01-13 NOTE — PROGRESS NOTES
2020     Peri Rivas (:  1955) is a 71 y.o. female, here for evaluation of the following medical concerns:    Chest Pain    This is a new problem. The current episode started more than 1 month ago. The onset quality is sudden. The problem occurs intermittently. The problem has been unchanged. The pain is at a severity of 3/10. The pain is mild. The quality of the pain is described as dull and tightness. The pain does not radiate. Associated symptoms include shortness of breath. Pertinent negatives include no abdominal pain, back pain, claudication, cough, diaphoresis, dizziness, exertional chest pressure, fever, headaches, hemoptysis, irregular heartbeat, leg pain, lower extremity edema, malaise/fatigue, nausea, near-syncope, numbness, orthopnea, palpitations, PND, sputum production, syncope, vomiting or weakness. The pain is aggravated by nothing. She has tried nothing for the symptoms. The treatment provided no relief. Pertinent negatives for past medical history include no seizures. Whole body paresthesias since aneurysm surgery. Sometimes get flustered when she is inattentive. More stressed than in the past.  Over all progress since the aneurysm has been good, but not at the level of concentration where can return to work. Patient stopped amitriptyline due to felt hung over.       Patient Active Problem List   Diagnosis    Hives    Leg edema    TMJ (temporomandibular joint syndrome)    Breast mass, right    Vitamin D deficiency    Osteopenia    Candidiasis of mouth    Normal EEG    Seizure prophylaxis    Chronic hepatitis C without hepatic coma (HCC)    Moderate persistent asthma without complication    Essential hypertension    Immune to hepatitis B    Seasonal allergic rhinitis due to pollen    Urinary frequency     Allergies   Allergen Reactions    Aspirin Hives    Bimatoprost Hives    Lac Bovis Hives    Latanoprost Other (See Comments)     Headaches    Lisinopril Swelling    Macadamia Nut Oil      HIVES    Peanut-Containing Drug Products Hives    Salicylates      HIVES       Review of Systems   Constitutional: Negative for activity change, appetite change, chills, diaphoresis, fatigue, fever and malaise/fatigue. HENT: Negative for congestion, dental problem, drooling, ear discharge, ear pain, postnasal drip, rhinorrhea, sinus pressure, sinus pain, sneezing, sore throat, tinnitus and trouble swallowing. Eyes: Negative for pain. Respiratory: Positive for shortness of breath. Negative for apnea, cough, hemoptysis, sputum production, choking, chest tightness, wheezing and stridor. Asthma   Cardiovascular: Positive for chest pain. Negative for palpitations, orthopnea, claudication, syncope, PND and near-syncope. Hypertension. Gastrointestinal: Negative for abdominal distention, abdominal pain, anal bleeding, blood in stool, constipation, diarrhea, nausea, rectal pain and vomiting. Negative colonoscopy in 2013 and needs repeat in 2023   Endocrine: Negative. Genitourinary: Negative for decreased urine volume, difficulty urinating, dyspareunia, dysuria, enuresis, flank pain, frequency, genital sores, hematuria, menstrual problem, pelvic pain, urgency, vaginal bleeding and vaginal discharge. Menarche at age 14. Age of first child 21. Patient feed two children. Menopause at age 39. No family history of breast cancer. History of normal pap. Musculoskeletal: Negative for arthralgias, back pain, myalgias and neck pain. Skin: Negative for color change, pallor, rash and wound. Chronic hives under good control with no recent flareups on Xolair. Neurological: Negative. Negative for dizziness, tremors, seizures, syncope, facial asymmetry, speech difficulty, weakness, light-headedness, numbness and headaches. Expressive aphasia continues to improve and barely noticeable now.   No problems with word finding during our session today. No longer sleeping all the time. Now experiencing insomnia not help with melatonin. Hematological: Negative for adenopathy. Does not bruise/bleed easily. Psychiatric/Behavioral: Positive for sleep disturbance. Negative for agitation, behavioral problems, confusion, decreased concentration, dysphoric mood, hallucinations, self-injury and suicidal ideas. The patient is not nervous/anxious and is not hyperactive. .  Patient is sleepy during the day and awake all night and recently started on melatonin 5 mg nightly per neurosurgery. Prior to Visit Medications    Medication Sig Taking? Authorizing Provider   diphenhydrAMINE (BENADRYL ALLERGY) 25 MG capsule Take 2 tablets by mouth Yes Historical Provider, MD   mometasone (ELOCON) 0.1 % cream Apply topically daily.  Yes Kim Garner MD   cetirizine (ZYRTEC) 10 MG tablet Take 20 mg by mouth daily Yes Historical Provider, MD   amitriptyline (ELAVIL) 10 MG tablet Take 1 tablet by mouth nightly Yes Kim Garner MD   amLODIPine (NORVASC) 5 MG tablet Take 1 tablet by mouth daily Yes Kim Garner MD   beclomethasone (QVAR) 80 MCG/ACT inhaler Inhale 1 puff into the lungs 2 times daily Yes Kim Garner MD   montelukast (SINGULAIR) 10 MG tablet Take 1 tablet by mouth daily Yes Kim Garner MD   albuterol sulfate HFA (PROVENTIL HFA) 108 (90 Base) MCG/ACT inhaler Inhale 2 puffs into the lungs every 6 hours as needed for Wheezing Yes Kim Garner MD   tafluprost (ZIOPTAN) 0.0015 % SOLN ophthalmic drops 1 drop every evening Yes Historical Provider, MD   albuterol sulfate HFA (VENTOLIN HFA) 108 (90 Base) MCG/ACT inhaler INHALE TWO PUFFS BY MOUTH EVERY 6 HOURS AS NEEDED FOR WHEEZING Yes Kim Garner MD   fluticasone (FLONASE) 50 MCG/ACT nasal spray 1 spray by Each Nare route daily 1 Spray in each nostril Yes Kim Garner MD   EPINEPHrine (EPIPEN) 0.3 MG/0.3ML PATRICK injection Inject 0.3 mLs into the muscle once as needed for 1 dose. Yes Sharla Chinchilla MD   timolol (TIMOPTIC) 0.5 % ophthalmic solution Place 1 drop into both eyes daily. Yes Historical Provider, MD   Calcium Carbonate-Vitamin D (CALCIUM 600 + D) 600-400 MG-UNIT TABS Take  by mouth daily. Yes Historical Provider, MD   Multiple Vitamin (MULTIVITAMIN PO) Take  by mouth daily. Yes Historical Provider, MD   Cholecalciferol (VITAMIN D3) 5000 UNITS CAPS Take 1 capsule by mouth daily. Yes Sharla Chinchilla MD   oxyCODONE-acetaminophen (PERCOCET) 5-325 MG per tablet Take 1 tablet by mouth every 4 hours as needed for Pain. Historical Provider, MD        Social History     Tobacco Use    Smoking status: Former Smoker     Packs/day: 0.50     Years: 2.00     Pack years: 1.00     Types: Cigarettes     Last attempt to quit: 1982     Years since quittin.7    Smokeless tobacco: Never Used   Substance Use Topics    Alcohol use: Yes     Comment: rarely        Vitals:    20 1124   BP: 132/84   Site: Left Upper Arm   Position: Sitting   Cuff Size: Large Adult   Pulse: 63   Resp: 18   Temp: 98.1 °F (36.7 °C)   TempSrc: Oral   SpO2: 98%   Weight: 158 lb (71.7 kg)   Height: 5' 8.5\" (1.74 m)     Estimated body mass index is 23.67 kg/m² as calculated from the following:    Height as of this encounter: 5' 8.5\" (1.74 m). Weight as of this encounter: 158 lb (71.7 kg). Physical Exam  Vitals signs and nursing note reviewed. Constitutional:       General: She is not in acute distress. Appearance: She is well-developed. She is not diaphoretic. HENT:      Head: Normocephalic and atraumatic. Right Ear: External ear normal.      Left Ear: External ear normal.      Nose: Nose normal.      Mouth/Throat:      Pharynx: No oropharyngeal exudate. Eyes:      General: No scleral icterus. Right eye: No discharge. Left eye: No discharge.       Conjunctiva/sclera: Conjunctivae normal. Pupils: Pupils are equal, round, and reactive to light. Neck:      Musculoskeletal: Normal range of motion and neck supple. Thyroid: No thyromegaly. Vascular: No JVD. Trachea: No tracheal deviation. Cardiovascular:      Rate and Rhythm: Normal rate and regular rhythm. Heart sounds: Normal heart sounds. No murmur. No gallop. Pulmonary:      Effort: Pulmonary effort is normal. No respiratory distress. Breath sounds: No wheezing or rales. Chest:      Chest wall: No tenderness. Abdominal:      General: Bowel sounds are normal. There is no distension. Palpations: Abdomen is soft. There is no mass. Tenderness: There is no tenderness. Musculoskeletal:         General: No tenderness. Comments: The spine is straight. No joint abnormalities were noted. Lymphadenopathy:      Cervical: No cervical adenopathy. Skin:     General: Skin is warm and dry. Findings: No rash. Comments: Eczema on neck and puritic   Neurological:      Mental Status: She is alert and oriented to person, place, and time. Cranial Nerves: No cranial nerve deficit. Motor: No abnormal muscle tone. Coordination: Coordination normal.      Comments: Expressive aphasia, mild. Mildly ataxic gait. IT is much improved from initally after surgery. Psychiatric:         Behavior: Behavior normal.         Thought Content: Thought content normal.         Judgment: Judgment normal.     no vibratory sensation of face, but has bilateral hands. ASSESSMENT/PLAN:   Diagnosis Orders   1. Paresthesia  citalopram (CELEXA) 20 MG tablet    Vitamin B12   2. Attention and concentration deficit  ROBERTO - Yovani Emery, PhD, Psychology, [facility]    citalopram (CELEXA) 20 MG tablet   3. Vitamin D deficiency on supplement, check level. Vitamin D 25 Hydroxy   4.  Essential hypertension   BP Readings from Last 3 Encounters:   01/13/20 132/84   11/08/19 134/82   10/11/19 (!) 140/86   improving on medication, continue. Renal Function Panel    CBC Auto Differential   5. Precordial pain  and     MESSINA (dyspnea on exertion) with history of cad in both parents. Will further evaluate. Lipid Panel    NM MYOCARDIAL SPECT REST EXERCISE OR RX       An electronic signature was used to authenticate this note.     --Mario Qiu MD on 1/13/2020 at 12:04 PM

## 2020-01-14 ASSESSMENT — ENCOUNTER SYMPTOMS
CHEST TIGHTNESS: 0
APNEA: 0
ANAL BLEEDING: 0
EYE PAIN: 0
ABDOMINAL DISTENTION: 0
SINUS PRESSURE: 0
RHINORRHEA: 0
RECTAL PAIN: 0
TROUBLE SWALLOWING: 0
COLOR CHANGE: 0
WHEEZING: 0
DIARRHEA: 0
CONSTIPATION: 0
SORE THROAT: 0
BLOOD IN STOOL: 0
STRIDOR: 0
SINUS PAIN: 0
CHOKING: 0

## 2020-01-15 LAB
25-HYDROXYVITAMIN D2 AND D3: 81 NG/ML (ref 30–80)
ALBUMIN SERPL-MCNC: 3.8 G/DL (ref 3.5–5)
ANION GAP SERPL CALCULATED.3IONS-SCNC: 7 MMOL/L (ref 5–13)
ATYPICAL LYMPHOCYTE RELATIVE PERCENT: 1.2 % (ref 0–9)
BASOPHILS ABSOLUTE: 0 10*3/UL (ref 0–0.2)
BASOPHILS RELATIVE PERCENT: 0 %
BUN / CREAT RATIO: 16
BUN BLDV-MCNC: 12 MG/DL (ref 7–25)
CALCIUM SERPL-MCNC: 8.9 MG/DL (ref 8.4–10.5)
CHLORIDE BLD-SCNC: 108 MMOL/L (ref 98–110)
CHOLESTEROL, TOTAL: 182 MG/DL (ref 125–199)
CHOLESTEROL/HDL RATIO: 2.9 (ref 0–5)
CO2: 29 MMOL/L (ref 22–29)
CREAT SERPL-MCNC: 0.75 MG/DL (ref 0.5–1.2)
EOSINOPHILS ABSOLUTE: 0 10*3/UL (ref 0–0.5)
EOSINOPHILS RELATIVE PERCENT: 0 %
GFR AFRICAN AMERICAN: 94 SEE NOTE
GFR NON-AFRICAN AMERICAN: 78 SEE NOTE
GLUCOSE BLD-MCNC: 87 MG/DL (ref 70–99)
GRANULOCYTE ABSOLUTE COUNT: 0.8 10*3/UL (ref 1.5–7.8)
HCT VFR BLD CALC: 34.3 % (ref 35–45)
HDLC SERPL-MCNC: 62 MG/DL (ref 40–180)
HEMOGLOBIN: 11 G/DL (ref 11.7–15.5)
LDL CHOLESTEROL CALCULATED: 111 MG/DL (ref 0–100)
LYMPHOCYTES ABSOLUTE: 1.2 10*3/UL (ref 0.8–3.9)
LYMPHOCYTES RELATIVE PERCENT: 51.9 %
MCH RBC QN AUTO: 25 PG (ref 27–33)
MCHC RBC AUTO-ENTMCNC: 32 G/DL (ref 32–36)
MCV RBC AUTO: 78.1 FL (ref 80–100)
MONOCYTES ABSOLUTE: 0.3 10*3/UL (ref 0.2–0.9)
MONOCYTES RELATIVE PERCENT: 11.1 %
NUCLEATED RED BLOOD CELLS: 1 /100 WBC (ref 0–0)
PDW BLD-RTO: 18.1 % (ref 11–15)
PHOSPHORUS: 3.7 MG/DL (ref 2.1–4.3)
PLATELET # BLD: 225 10*3/UL (ref 140–400)
PLATELETS, LARGE: PRESENT
PMV BLD AUTO: 8.8 FL (ref 7.5–11.5)
POTASSIUM SERPL-SCNC: 3.8 MMOL/L (ref 3.5–5.1)
RBC # BLD: 4.4 10*6/UL (ref 3.8–5.1)
SEGMENTED NEUTROPHILS RELATIVE PERCENT: 35.8 %
SODIUM BLD-SCNC: 144 MMOL/L (ref 135–146)
TRIGL SERPL-MCNC: 46 MG/DL (ref 0–150)
VITAMIN B-12: 961 PG/ML (ref 213–816)
WBC # BLD: 2.3 10*3/UL (ref 3.8–10.8)

## 2020-01-30 ENCOUNTER — TELEPHONE (OUTPATIENT)
Dept: ORTHOPEDIC SURGERY | Age: 65
End: 2020-01-30

## 2020-02-19 ENCOUNTER — OFFICE VISIT (OUTPATIENT)
Dept: PRIMARY CARE CLINIC | Age: 65
End: 2020-02-19
Payer: COMMERCIAL

## 2020-02-19 VITALS
RESPIRATION RATE: 18 BRPM | HEART RATE: 65 BPM | TEMPERATURE: 97.5 F | BODY MASS INDEX: 24.11 KG/M2 | WEIGHT: 162.8 LBS | HEIGHT: 69 IN | SYSTOLIC BLOOD PRESSURE: 128 MMHG | OXYGEN SATURATION: 95 % | DIASTOLIC BLOOD PRESSURE: 82 MMHG

## 2020-02-19 PROBLEM — G31.84 MCI (MILD COGNITIVE IMPAIRMENT) WITH MEMORY LOSS: Status: ACTIVE | Noted: 2020-02-19

## 2020-02-19 PROCEDURE — 99214 OFFICE O/P EST MOD 30 MIN: CPT | Performed by: INTERNAL MEDICINE

## 2020-02-19 RX ORDER — TAFLUPROST 0 MG/.3ML
SOLUTION/ DROPS OPHTHALMIC
COMMUNITY
Start: 2020-01-13

## 2020-02-19 ASSESSMENT — ENCOUNTER SYMPTOMS
PHOTOPHOBIA: 0
APNEA: 0
ABDOMINAL DISTENTION: 0
BLOOD IN STOOL: 0
TROUBLE SWALLOWING: 0
DIARRHEA: 0
CONSTIPATION: 0
STRIDOR: 0
SINUS PAIN: 0
EYE PAIN: 0
RHINORRHEA: 0
VOMITING: 0
BACK PAIN: 0
ABDOMINAL PAIN: 0
CHEST TIGHTNESS: 0
SHORTNESS OF BREATH: 0
COUGH: 0
CHOKING: 0
RECTAL PAIN: 0
SINUS PRESSURE: 0
NAUSEA: 0
COLOR CHANGE: 0
SORE THROAT: 0
ANAL BLEEDING: 0
SCALP TENDERNESS: 1
WHEEZING: 0

## 2020-02-19 NOTE — PROGRESS NOTES
ear discharge, ear pain, postnasal drip, rhinorrhea, sinus pressure, sinus pain, sneezing, sore throat, tinnitus and trouble swallowing. Eyes: Negative for photophobia and pain. Respiratory: Negative for apnea, cough, choking, chest tightness, shortness of breath, wheezing and stridor. Asthma   Cardiovascular: Negative for chest pain and palpitations. Hypertension. Gastrointestinal: Negative for abdominal distention, abdominal pain, anal bleeding, blood in stool, constipation, diarrhea, nausea, rectal pain and vomiting. Negative colonoscopy in 2013 and needs repeat in 2023   Endocrine: Negative. Genitourinary: Negative for decreased urine volume, difficulty urinating, dyspareunia, dysuria, enuresis, flank pain, frequency, genital sores, hematuria, menstrual problem, pelvic pain, urgency, vaginal bleeding and vaginal discharge. Menarche at age 14. Age of first child 21. Patient feed two children. Menopause at age 39. No family history of breast cancer. History of normal pap. Musculoskeletal: Negative for arthralgias, back pain, myalgias and neck pain. Skin: Negative for color change, pallor, rash and wound. Chronic hives under good control with no recent flareups on Xolair. Neurological: Negative. Negative for dizziness, tremors, seizures, syncope, facial asymmetry, speech difficulty, weakness, light-headedness, numbness, headaches and loss of balance. Expressive aphasia continues to improve and barely noticeable now. No problems with word finding during our session today. No longer sleeping all the time. Now experiencing insomnia not help with melatonin. Hematological: Negative for adenopathy. Does not bruise/bleed easily. Psychiatric/Behavioral: Positive for sleep disturbance. Negative for agitation, behavioral problems, confusion, decreased concentration, dysphoric mood, hallucinations, self-injury and suicidal ideas.  The patient is not nervous/anxious and is not hyperactive. .  Patient is sleepy during the day and awake all night and recently started on melatonin 5 mg nightly per neurosurgery. Prior to Visit Medications    Medication Sig Taking? Authorizing Provider   Cholecalciferol (VITAMIN D3) 125 MCG (5000 UT) CAPS Take 1 capsule by mouth three times a week Yes Alexa Spivey MD   beclomethasone (QVAR) 80 MCG/ACT inhaler Inhale 1 puff into the lungs 2 times daily Yes Alexa Spivey MD   citalopram (CELEXA) 20 MG tablet Take 1 tablet by mouth daily Yes Alexa Spivey MD   diphenhydrAMINE (BENADRYL ALLERGY) 25 MG capsule Take 2 tablets by mouth Yes Historical Provider, MD   mometasone (ELOCON) 0.1 % cream Apply topically daily. Yes Alexa Spivey MD   cetirizine (ZYRTEC) 10 MG tablet Take 20 mg by mouth daily Yes Historical Provider, MD   amLODIPine (NORVASC) 5 MG tablet Take 1 tablet by mouth daily Yes Alexa Spivey MD   montelukast (SINGULAIR) 10 MG tablet Take 1 tablet by mouth daily Yes Alexa Spivey MD   albuterol sulfate HFA (PROVENTIL HFA) 108 (90 Base) MCG/ACT inhaler Inhale 2 puffs into the lungs every 6 hours as needed for Wheezing Yes Alexa Spivey MD   oxyCODONE-acetaminophen (PERCOCET) 5-325 MG per tablet Take 1 tablet by mouth every 4 hours as needed for Pain. Yes Historical Provider, MD   tafluprost (ZIOPTAN) 0.0015 % SOLN ophthalmic drops 1 drop every evening Yes Historical Provider, MD   albuterol sulfate HFA (VENTOLIN HFA) 108 (90 Base) MCG/ACT inhaler INHALE TWO PUFFS BY MOUTH EVERY 6 HOURS AS NEEDED FOR WHEEZING Yes Alexa Spivey MD   fluticasone (FLONASE) 50 MCG/ACT nasal spray 1 spray by Each Nare route daily 1 Spray in each nostril Yes Alexa Spivey MD   EPINEPHrine (EPIPEN) 0.3 MG/0.3ML PATRICK injection Inject 0.3 mLs into the muscle once as needed for 1 dose.  Yes Alexa Spivey MD   timolol (TIMOPTIC) 0.5 % ophthalmic solution Place 1 drop into both eyes daily. Yes Historical Provider, MD   Calcium Carbonate-Vitamin D (CALCIUM 600 + D) 600-400 MG-UNIT TABS Take  by mouth daily. Yes Historical Provider, MD   Multiple Vitamin (MULTIVITAMIN PO) Take  by mouth daily. Yes Historical Provider, MD   ZIOPTAN 0.0015 % SOLN   Historical Provider, MD        Social History     Tobacco Use    Smoking status: Former Smoker     Packs/day: 0.50     Years: 2.00     Pack years: 1.00     Types: Cigarettes     Last attempt to quit: 1982     Years since quittin.8    Smokeless tobacco: Never Used   Substance Use Topics    Alcohol use: Yes     Comment: rarely        Vitals:    20 0812   BP: 118/64   Site: Left Upper Arm   Position: Sitting   Cuff Size: Large Adult   Pulse: 65   Resp: 18   Temp: 97.5 °F (36.4 °C)   TempSrc: Oral   SpO2: 95%   Height: 5' 8.5\" (1.74 m)     Estimated body mass index is 23.67 kg/m² as calculated from the following:    Height as of this encounter: 5' 8.5\" (1.74 m). Weight as of 20: 158 lb (71.7 kg). Physical Exam  Vitals signs and nursing note reviewed. Constitutional:       General: She is not in acute distress. Appearance: She is well-developed. She is not diaphoretic. HENT:      Head: Normocephalic and atraumatic. Right Ear: External ear normal.      Left Ear: External ear normal.      Nose: Nose normal.      Mouth/Throat:      Pharynx: No oropharyngeal exudate. Eyes:      General: No scleral icterus. Right eye: No discharge. Left eye: No discharge. Conjunctiva/sclera: Conjunctivae normal.      Pupils: Pupils are equal, round, and reactive to light. Neck:      Musculoskeletal: Normal range of motion and neck supple. Thyroid: No thyromegaly. Vascular: No JVD. Trachea: No tracheal deviation. Cardiovascular:      Rate and Rhythm: Normal rate and regular rhythm. Heart sounds: Normal heart sounds. No murmur. No gallop.     Pulmonary: Effort: Pulmonary effort is normal. No respiratory distress. Breath sounds: No wheezing or rales. Chest:      Chest wall: No tenderness. Abdominal:      General: Bowel sounds are normal. There is no distension. Palpations: Abdomen is soft. There is no mass. Tenderness: There is no abdominal tenderness. Musculoskeletal:         General: No tenderness. Comments: The spine is straight. No joint abnormalities were noted. Lymphadenopathy:      Cervical: No cervical adenopathy. Skin:     General: Skin is warm and dry. Findings: No rash. Comments: Eczema on neck and puritic   Neurological:      Mental Status: She is alert and oriented to person, place, and time. Cranial Nerves: No cranial nerve deficit. Motor: No abnormal muscle tone. Coordination: Coordination normal.      Comments: Expressive aphasia, mild. Mildly ataxic gait. IT is much improved from initally after surgery. Psychiatric:         Behavior: Behavior normal.         Thought Content: Thought content normal.         Judgment: Judgment normal.         ASSESSMENT/PLAN:  Patient is seeing Navdeep [address] ,[place], [address] [facility]    N      Diagnosis Orders   1. MCI (mild cognitive impairment) this has improved since the surgery but not enough for patient to return to work because she is still an attentive and speak slowly and processes slowly. Will refer her for official neurocognitive testing. In this will need to be reevaluated in a year. Paperwork completed for work. Mckay Carrillo, PhD, Psychology, [facility]   2. Paresthesia noted in the leg intermittently. Will also get EEG make surepartial complex seizure disorder. This will also explain inattentiveness. 3. Attention and concentration deficit: same as problem number two.     4. MCI (mild cognitive impairment) with memory loss: same as problem number one.      5. Microcytic anemia will follow-up evaluation with CBC and iron studies. CBC Auto Differential       An electronic signature was used to authenticate this note.     --Sheila Morales MD on 2/19/2020 at 8:43 AM

## 2020-02-20 PROBLEM — I72.9 ANEURYSM (HCC): Status: ACTIVE | Noted: 2020-02-20

## 2020-02-20 ASSESSMENT — PATIENT HEALTH QUESTIONNAIRE - PHQ9
SUM OF ALL RESPONSES TO PHQ QUESTIONS 1-9: 0
2. FEELING DOWN, DEPRESSED OR HOPELESS: 0
SUM OF ALL RESPONSES TO PHQ9 QUESTIONS 1 & 2: 0
SUM OF ALL RESPONSES TO PHQ QUESTIONS 1-9: 0
1. LITTLE INTEREST OR PLEASURE IN DOING THINGS: 0

## 2020-03-31 ENCOUNTER — VIRTUAL VISIT (OUTPATIENT)
Dept: PRIMARY CARE CLINIC | Age: 65
End: 2020-03-31
Payer: COMMERCIAL

## 2020-03-31 PROCEDURE — 99213 OFFICE O/P EST LOW 20 MIN: CPT | Performed by: INTERNAL MEDICINE

## 2020-03-31 RX ORDER — CETIRIZINE HYDROCHLORIDE 10 MG/1
10 TABLET ORAL 2 TIMES DAILY
Qty: 60 TABLET | Refills: 5 | Status: SHIPPED | OUTPATIENT
Start: 2020-03-31 | End: 2020-05-29 | Stop reason: SDUPTHER

## 2020-03-31 RX ORDER — PREDNISONE 10 MG/1
TABLET ORAL
Qty: 30 TABLET | Refills: 0 | Status: SHIPPED | OUTPATIENT
Start: 2020-03-31 | End: 2020-07-07

## 2020-03-31 RX ORDER — FAMOTIDINE 20 MG/1
20 TABLET, FILM COATED ORAL 2 TIMES DAILY
Qty: 180 TABLET | Refills: 1 | Status: SHIPPED | OUTPATIENT
Start: 2020-03-31 | End: 2020-07-07 | Stop reason: SDUPTHER

## 2020-03-31 ASSESSMENT — ENCOUNTER SYMPTOMS
SORE THROAT: 0
NAUSEA: 0
STRIDOR: 0
COLOR CHANGE: 0
ABDOMINAL DISTENTION: 0
CHOKING: 0
SINUS PAIN: 0
RECTAL PAIN: 0
DIARRHEA: 0
RHINORRHEA: 0
EYE PAIN: 0
APNEA: 0
BLOOD IN STOOL: 0
SHORTNESS OF BREATH: 0
PHOTOPHOBIA: 0
TROUBLE SWALLOWING: 0
CHEST TIGHTNESS: 0
VOMITING: 0
ABDOMINAL PAIN: 0
BACK PAIN: 0
COUGH: 1
ANAL BLEEDING: 0
CONSTIPATION: 0
SINUS PRESSURE: 0
WHEEZING: 0
URTICARIA: 1

## 2020-03-31 NOTE — PROGRESS NOTES
3/31/2020     Chey Ortiz (:  1955) is a 71 y.o. female, here for evaluation of the following medical concerns:    Urticaria   This is a new problem. The current episode started in the past 7 days. The problem is unchanged. Location: chest and face, arms and legs. The rash is characterized by dryness and itchiness. Associated symptoms include coughing. Pertinent negatives include no congestion, diarrhea, eye pain, fatigue, fever, rhinorrhea, shortness of breath, sore throat or vomiting. (Bad cough last week relieved with  mucinex and honey and lemon juice and it resolved.)    URI :    Symptoms last week with rhinorrhea , cough and all symptoms have resolved. No shortness of breath with normal activity. NO cough in three to four days. .  NO fever or chills. Patient Active Problem List   Diagnosis    Hives    Leg edema    TMJ (temporomandibular joint syndrome)    Breast mass, right    Vitamin D deficiency    Osteopenia    Candidiasis of mouth    Normal EEG    Seizure prophylaxis    Chronic hepatitis C without hepatic coma (HCC)    Moderate persistent asthma without complication    Essential hypertension    Immune to hepatitis B    Seasonal allergic rhinitis due to pollen    Urinary frequency    MCI (mild cognitive impairment) with memory loss    Aneurysm (HCC)     Allergies   Allergen Reactions    Aspirin Hives    Bimatoprost Hives    Lac Bovis Hives    Latanoprost Other (See Comments)     Headaches    Lisinopril Swelling    Macadamia Nut Oil      HIVES    Peanut-Containing Drug Products Hives    Salicylates      HIVES         Review of Systems   Constitutional: Negative for activity change, appetite change, chills, diaphoresis, fatigue and fever. HENT: Negative for congestion, dental problem, drooling, ear discharge, ear pain, postnasal drip, rhinorrhea, sinus pressure, sinus pain, sneezing, sore throat, tinnitus and trouble swallowing.     Eyes: Negative for photophobia and pain. Respiratory: Positive for cough. Negative for apnea, choking, chest tightness, shortness of breath, wheezing and stridor. Asthma   Cardiovascular: Negative for chest pain and palpitations. Hypertension. Gastrointestinal: Negative for abdominal distention, abdominal pain, anal bleeding, blood in stool, constipation, diarrhea, nausea, rectal pain and vomiting. Negative colonoscopy in 2013 and needs repeat in 2023   Endocrine: Negative. Genitourinary: Negative for decreased urine volume, difficulty urinating, dyspareunia, dysuria, enuresis, flank pain, frequency, genital sores, hematuria, menstrual problem, pelvic pain, urgency, vaginal bleeding and vaginal discharge. Menarche at age 14. Age of first child 21. Patient feed two children. Menopause at age 39. No family history of breast cancer. History of normal pap. Musculoskeletal: Negative for arthralgias, back pain, myalgias and neck pain. Skin: Negative for color change, pallor, rash and wound. Chronic hives under good control with no recent flareups on Xolair. Neurological: Negative. Negative for dizziness, tremors, seizures, syncope, facial asymmetry, speech difficulty, weakness, light-headedness, numbness and headaches. Expressive aphasia continues to improve and barely noticeable now. No problems with word finding during our session today. No longer sleeping all the time. Now experiencing insomnia not help with melatonin. Hematological: Negative for adenopathy. Does not bruise/bleed easily. Psychiatric/Behavioral: Negative for agitation, behavioral problems, confusion, decreased concentration, dysphoric mood, hallucinations, self-injury, sleep disturbance and suicidal ideas. The patient is not nervous/anxious and is not hyperactive. Prior to Visit Medications    Medication Sig Taking?  Authorizing Provider   ZIOPTAN 0.0015 % SOLN   Historical Provider, MD Tobacco Use    Smoking status: Former Smoker     Packs/day: 0.50     Years: 2.00     Pack years: 1.00     Types: Cigarettes     Last attempt to quit: 1982     Years since quittin.9    Smokeless tobacco: Never Used   Substance Use Topics    Alcohol use: Yes     Comment: rarely        There were no vitals filed for this visit. Estimated body mass index is 24.39 kg/m² as calculated from the following:    Height as of 20: 5' 8.5\" (1.74 m). Weight as of 20: 162 lb 12.8 oz (73.8 kg). Physical Exam  Constitutional:       General: She is not in acute distress. Appearance: Normal appearance. She is not ill-appearing, toxic-appearing or diaphoretic. HENT:      Head: Normocephalic and atraumatic. Nose:      Comments: No congestion sound with speech. Patient is not speaking nasally. Neck:      Musculoskeletal: Normal range of motion. Pulmonary:      Effort: Pulmonary effort is normal.   Abdominal:      Tenderness: There is no abdominal tenderness. Comments: Patient pressed on all quadrants of abdomen and no pain or tenderness noted per patient. Skin:     Findings: Rash present. Comments: Red raised rash on chest and red blotches on face , arms and back consistent with hives. Neurological:      Mental Status: She is alert. ASSESSMENT/PLAN:   Diagnosis Orders   1. Hives   Chronic history of hives on zyrtec 20 mg qd chronically will change to 10 mg bid, add famotidine 20 mg bid. Has not had xolair per immunology since 2019 and has done well up until a few weeks ago. It may have been triggered by recent uri that resolved with supportive care per patient. Add prednisone 10 mg bid for one week, the 10 mg daily for one wee, then 10 mq,    2. URI, acute  From a week ago resolved with supportive care. NO symptoms for the past 4 days. An electronic signature was used to authenticate this note.     --Tushar Mcmahon MD on 3/31/2020 at 12:37 PM

## 2020-04-06 RX ORDER — ALBUTEROL SULFATE 90 UG/1
2 AEROSOL, METERED RESPIRATORY (INHALATION) EVERY 6 HOURS PRN
Qty: 1 INHALER | Refills: 3 | Status: SHIPPED | OUTPATIENT
Start: 2020-04-06 | End: 2020-04-07 | Stop reason: SDUPTHER

## 2020-04-06 RX ORDER — CITALOPRAM 20 MG/1
20 TABLET ORAL DAILY
Qty: 30 TABLET | Refills: 5 | Status: SHIPPED | OUTPATIENT
Start: 2020-04-06 | End: 2020-04-07 | Stop reason: SDUPTHER

## 2020-04-06 RX ORDER — FLUTICASONE PROPIONATE 50 MCG
1 SPRAY, SUSPENSION (ML) NASAL DAILY
Qty: 1 BOTTLE | Refills: 2 | Status: SHIPPED | OUTPATIENT
Start: 2020-04-06 | End: 2020-04-07 | Stop reason: SDUPTHER

## 2020-04-06 RX ORDER — AMLODIPINE BESYLATE 5 MG/1
5 TABLET ORAL DAILY
Qty: 30 TABLET | Refills: 5 | Status: SHIPPED | OUTPATIENT
Start: 2020-04-06 | End: 2020-04-07 | Stop reason: SDUPTHER

## 2020-04-07 RX ORDER — FLUTICASONE PROPIONATE 50 MCG
1 SPRAY, SUSPENSION (ML) NASAL DAILY
Qty: 3 BOTTLE | Refills: 3 | Status: SHIPPED | OUTPATIENT
Start: 2020-04-07 | End: 2020-12-24 | Stop reason: SDUPTHER

## 2020-04-07 RX ORDER — CITALOPRAM 20 MG/1
20 TABLET ORAL DAILY
Qty: 90 TABLET | Refills: 3 | Status: SHIPPED | OUTPATIENT
Start: 2020-04-07 | End: 2020-07-07 | Stop reason: SDUPTHER

## 2020-04-07 RX ORDER — AMLODIPINE BESYLATE 5 MG/1
5 TABLET ORAL DAILY
Qty: 90 TABLET | Refills: 3 | Status: SHIPPED | OUTPATIENT
Start: 2020-04-07 | End: 2020-07-07 | Stop reason: SDUPTHER

## 2020-04-07 RX ORDER — ALBUTEROL SULFATE 90 UG/1
2 AEROSOL, METERED RESPIRATORY (INHALATION) EVERY 6 HOURS PRN
Qty: 3 INHALER | Refills: 3 | Status: SHIPPED | OUTPATIENT
Start: 2020-04-07 | End: 2020-12-24 | Stop reason: SDUPTHER

## 2020-05-01 ENCOUNTER — VIRTUAL VISIT (OUTPATIENT)
Dept: PRIMARY CARE CLINIC | Age: 65
End: 2020-05-01
Payer: MEDICARE

## 2020-05-01 VITALS
DIASTOLIC BLOOD PRESSURE: 77 MMHG | WEIGHT: 161.6 LBS | BODY MASS INDEX: 23.93 KG/M2 | RESPIRATION RATE: 16 BRPM | HEIGHT: 69 IN | TEMPERATURE: 98.6 F | SYSTOLIC BLOOD PRESSURE: 116 MMHG | HEART RATE: 62 BPM

## 2020-05-01 PROCEDURE — 99214 OFFICE O/P EST MOD 30 MIN: CPT | Performed by: INTERNAL MEDICINE

## 2020-05-01 ASSESSMENT — ENCOUNTER SYMPTOMS
EYE REDNESS: 0
ORTHOPNEA: 0
COUGH: 0
BACK PAIN: 0
SINUS PRESSURE: 0
RHINORRHEA: 0
EYE PAIN: 0
SORE THROAT: 0
EYE WATERING: 0
SCALP TENDERNESS: 0
URTICARIA: 1
SWOLLEN GLANDS: 0
BLURRED VISION: 0
SHORTNESS OF BREATH: 0
VOMITING: 0
VISUAL CHANGE: 0
PHOTOPHOBIA: 0
NAUSEA: 0
FACIAL SWEATING: 0
ABDOMINAL PAIN: 0

## 2020-05-01 ASSESSMENT — PATIENT HEALTH QUESTIONNAIRE - PHQ9
SUM OF ALL RESPONSES TO PHQ QUESTIONS 1-9: 0
2. FEELING DOWN, DEPRESSED OR HOPELESS: 0
SUM OF ALL RESPONSES TO PHQ QUESTIONS 1-9: 0
1. LITTLE INTEREST OR PLEASURE IN DOING THINGS: 0
SUM OF ALL RESPONSES TO PHQ9 QUESTIONS 1 & 2: 0

## 2020-05-01 NOTE — PROGRESS NOTES
Negative for cough and shortness of breath. Cardiovascular: Negative for palpitations, orthopnea and PND. Gastrointestinal: Negative for abdominal pain, anorexia, nausea and vomiting. Musculoskeletal: Negative for back pain and neck pain. Neurological: Positive for headaches. Negative for dizziness, tingling, seizures, numbness and loss of balance. Psychiatric/Behavioral: The patient does not have insomnia. Prior to Visit Medications    Medication Sig Taking? Authorizing Provider   fluticasone (FLONASE) 50 MCG/ACT nasal spray 1 spray by Each Nostril route daily 1 Spray in each nostril  Kalpana Brothers MD   albuterol sulfate HFA (PROVENTIL HFA) 108 (90 Base) MCG/ACT inhaler Inhale 2 puffs into the lungs every 6 hours as needed for Wheezing  Kalpana Brothers MD   amLODIPine (NORVASC) 5 MG tablet Take 1 tablet by mouth daily  Kalpana Brothers MD   citalopram (CELEXA) 20 MG tablet Take 1 tablet by mouth daily  Kalpana Brothers MD   beclomethasone (QVAR) 80 MCG/ACT inhaler Inhale 1 puff into the lungs 2 times daily  Kalpana Brothers MD   EPINEPHrine (EPIPEN) 0.3 MG/0.3ML PATRICK injection Inject 0.3 mLs into the muscle once as needed (throat swelling.)  Kalpana Brothers MD   cetirizine (ZYRTEC) 10 MG tablet Take 1 tablet by mouth 2 times daily  Kalpana Brothers MD   famotidine (PEPCID) 20 MG tablet Take 1 tablet by mouth 2 times daily  Kalpana Brothers MD   predniSONE (DELTASONE) 10 MG tablet Take twice a day for one week, then once a day for one week, then every other day until gone. Kalpana Brothers MD   ZIOPTAN 0.0015 % SOLN   Historical Provider, MD   Cholecalciferol (VITAMIN D3) 125 MCG (5000 UT) CAPS Take 1 capsule by mouth three times a week  Kalpana Brothers MD   diphenhydrAMINE (BENADRYL ALLERGY) 25 MG capsule Take 2 tablets by mouth  Historical Provider, MD   mometasone (ELOCON) 0.1 % cream Apply topically daily.   Brenda Cristina verified at the start of the visit: Yes    Total time spent for this encounter: 25 minutes    Services were provided through a video synchronous discussion virtually to substitute for in-person clinic visit. Patient and provider were located at their individual homes. --Lenard Palm MD on 5/1/2020 at 11:01 AM    An electronic signature was used to authenticate this note. An electronic signature was used to authenticate this note.     --Lenard Palm MD on 5/1/2020 at 10:32 AM

## 2020-05-06 RX ORDER — MONTELUKAST SODIUM 10 MG/1
10 TABLET ORAL DAILY
Qty: 90 TABLET | Refills: 3 | Status: SHIPPED | OUTPATIENT
Start: 2020-05-06 | End: 2020-12-24 | Stop reason: SDUPTHER

## 2020-05-06 RX ORDER — EPINEPHRINE 0.3 MG/.3ML
0.3 INJECTION SUBCUTANEOUS ONCE
Qty: 0.3 ML | Refills: 1 | Status: SHIPPED | OUTPATIENT
Start: 2020-05-06 | End: 2020-07-07

## 2020-05-29 RX ORDER — CETIRIZINE HYDROCHLORIDE 10 MG/1
10 TABLET ORAL 2 TIMES DAILY
Qty: 180 TABLET | Refills: 3 | Status: SHIPPED | OUTPATIENT
Start: 2020-05-29 | End: 2020-12-24 | Stop reason: SDUPTHER

## 2020-07-07 ENCOUNTER — VIRTUAL VISIT (OUTPATIENT)
Dept: PRIMARY CARE CLINIC | Age: 65
End: 2020-07-07
Payer: MEDICARE

## 2020-07-07 PROBLEM — K21.9 GASTROESOPHAGEAL REFLUX DISEASE WITHOUT ESOPHAGITIS: Status: ACTIVE | Noted: 2019-07-10

## 2020-07-07 PROBLEM — E04.1 THYROID NODULE: Status: ACTIVE | Noted: 2019-11-05

## 2020-07-07 PROCEDURE — 99214 OFFICE O/P EST MOD 30 MIN: CPT | Performed by: INTERNAL MEDICINE

## 2020-07-07 RX ORDER — CITALOPRAM 20 MG/1
20 TABLET ORAL DAILY
Qty: 90 TABLET | Refills: 3 | Status: SHIPPED | OUTPATIENT
Start: 2020-07-07 | End: 2020-12-24 | Stop reason: SDUPTHER

## 2020-07-07 RX ORDER — FAMOTIDINE 20 MG/1
20 TABLET, FILM COATED ORAL 2 TIMES DAILY
Qty: 180 TABLET | Refills: 1 | Status: SHIPPED | OUTPATIENT
Start: 2020-07-07 | End: 2020-12-24 | Stop reason: SDUPTHER

## 2020-07-07 RX ORDER — AMLODIPINE BESYLATE 5 MG/1
5 TABLET ORAL DAILY
Qty: 90 TABLET | Refills: 3 | Status: SHIPPED | OUTPATIENT
Start: 2020-07-07 | End: 2020-12-24 | Stop reason: SDUPTHER

## 2020-07-07 RX ORDER — EPINEPHRINE 0.3 MG/.3ML
0.3 INJECTION SUBCUTANEOUS
COMMUNITY
Start: 2020-05-09 | End: 2020-12-24 | Stop reason: SDUPTHER

## 2020-07-07 NOTE — PROGRESS NOTES
2020    TELEHEALTH EVALUATION -- Audio/Visual (During OBQWB-24 public health emergency)    HPI:    Mariel Becker (:  1955) has requested an audio/video evaluation for the following concern(s):    Shingles type rash in the healing phase on left T7 - T12. Patient has shingrix last year. History of shingles. It started two weeks ago. Patient has been putting alcohol . It started as small blisters and heals scabbing. Hypertension: Patient here for follow-up of elevated blood pressure. She is exercising and is adherent to low salt diet. Blood pressure is well controlled at home. Cardiac symptoms none. Patient denies chest pain, claudication, dyspnea, fatigue, irregular heart beat, lower extremity edema, near-syncope, orthopnea, palpitations, paroxysmal nocturnal dyspnea, syncope and tachypnea. Cardiovascular risk factors: advanced age (older than 54 for men, 72 for women), dyslipidemia and hypertension. Use of agents associated with hypertension: none. History of target organ damage: none. Review of Systems   Constitutional: Negative for activity change, appetite change, chills, diaphoresis, fatigue and fever. HENT: Negative for congestion, dental problem, drooling, ear discharge, ear pain, postnasal drip, rhinorrhea, sinus pressure, sinus pain, sneezing, sore throat, tinnitus and trouble swallowing. Eyes: Negative for pain. Respiratory: Negative for apnea, cough, choking, chest tightness, shortness of breath, wheezing and stridor. Asthma   Cardiovascular: Negative for chest pain and palpitations. Hypertension. Gastrointestinal: Negative for abdominal distention, abdominal pain, anal bleeding, blood in stool, constipation, diarrhea, nausea, rectal pain and vomiting. Negative colonoscopy in  and needs repeat in    Endocrine: Negative.     Genitourinary: Negative for decreased urine volume, difficulty urinating, dyspareunia, dysuria, enuresis, flank pain, frequency, genital sores, hematuria, menstrual problem, pelvic pain, urgency, vaginal bleeding and vaginal discharge. Menarche at age 14. Age of first child 21. Patient feed two children. Menopause at age 39. No family history of breast cancer. History of normal pap. Musculoskeletal: Negative for arthralgias, back pain, myalgias and neck pain. Skin: Negative for color change, pallor, rash and wound. Chronic hives under good control with no recent flareups on Xolair. Neurological: Negative. Negative for dizziness, tremors, seizures, syncope, facial asymmetry, speech difficulty, weakness, light-headedness, numbness and headaches. Aneurysm repair in July 2019 at [facility]   Hematological: Negative for adenopathy. Does not bruise/bleed easily. Psychiatric/Behavioral: Negative for agitation, behavioral problems, confusion, decreased concentration, dysphoric mood, hallucinations, self-injury, sleep disturbance and suicidal ideas. The patient is not nervous/anxious and is not hyperactive. .  Patient is sleepy during the day and awake all night and recently started on melatonin 5 mg nightly per neurosurgery. Prior to Visit Medications    Medication Sig Taking?  Authorizing Provider   cetirizine (ZYRTEC) 10 MG tablet Take 1 tablet by mouth 2 times daily  Eldon Gonzalez MD   montelukast (SINGULAIR) 10 MG tablet Take 1 tablet by mouth daily  Eldon Gonzalez MD   EPINEPHrine (EPIPEN 2-WILMER) 0.3 MG/0.3ML SOAJ injection Inject 0.3 mLs into the skin once for 1 dose Use as directed for allergic reaction  Eldon Gonzalez MD   fluticasone (FLONASE) 50 MCG/ACT nasal spray 1 spray by Each Nostril route daily 1 Spray in each nostril  Eldon Gonzalez MD   albuterol sulfate HFA (PROVENTIL HFA) 108 (90 Base) MCG/ACT inhaler Inhale 2 puffs into the lungs every 6 hours as needed for Wheezing  Eldon Gonzalez MD   amLODIPine (NORVASC) 5 MG tablet Take 1 tablet by Aspirin Hives    Bimatoprost Hives    Lac Bovis Hives    Latanoprost Other (See Comments)     Headaches    Lisinopril Swelling    Macadamia Nut Oil      HIVES    Peanut-Containing Drug Products Hives    Salicylates      HIVES   ,   Past Medical History:   Diagnosis Date    Allergic rhinitis     Asthma     Gastroesophageal reflux disease without esophagitis 7/10/2019    Hypertension     Seizures ([address]     after head trauma after passing out and hit her head.       Urticaria, idiopathic    ,   Past Surgical History:   Procedure Laterality Date    CHOLECYSTECTOMY      COLONOSCOPY      EYE SURGERY      lasik   ,   Social History     Tobacco Use    Smoking status: Former Smoker     Packs/day: 0.50     Years: 2.00     Pack years: 1.00     Types: Cigarettes     Last attempt to quit: 1982     Years since quittin.2    Smokeless tobacco: Never Used   Substance Use Topics    Alcohol use: Yes     Comment: rarely    Drug use: No   ,   Family History   Problem Relation Age of Onset    Arthritis Mother     High Blood Pressure Mother     High Cholesterol Mother     High Blood Pressure Sister    ,   Immunization History   Administered Date(s) Administered    Hepatitis A 2011    Hepatitis B 2011    Influenza A (C6K8-07) Vaccine PF IM 2009    Influenza Vaccine, unspecified formulation 2015, 10/01/2016    Influenza Virus Vaccine 2011, 10/17/2013    Influenza Whole 10/17/2013, 2016    Influenza, High Dose (Fluzone 65 yrs and older) 10/11/2019    Influenza, MDCK, Preservative free 2015    Influenza, Quadv, IM, PF (6 mo and older Fluzone, Flulaval, Fluarix, and 3 yrs and older Afluria) 10/14/2018    Pneumococcal Conjugate 13-valent ([id number]) 2019    Pneumococcal Conjugate Vaccine 2015    Pneumococcal Polysaccharide ([id number]) 2015    Zoster Live (Zostavax) 2016    Zoster Recombinant (Shingrix) 2019, 2019 Patient Active Problem List   Diagnosis    Hives    Leg edema    TMJ (temporomandibular joint syndrome)    Breast mass, right    Vitamin D deficiency    Osteopenia    Candidiasis of mouth    Normal EEG    Seizure prophylaxis    Chronic hepatitis C without hepatic coma (HCC)    Moderate persistent asthma without complication    Essential hypertension    Immune to hepatitis B    Seasonal allergic rhinitis due to pollen    Urinary frequency    MCI (mild cognitive impairment) with memory loss    Aneurysm (HCC)    Gastroesophageal reflux disease without esophagitis    Thyroid nodule    Idiopathic urticaria       PHYSICAL EXAMINATION:  [ INSTRUCTIONS:  \"[x]\" Indicates a positive item  \"[]\" Indicates a negative item  -- DELETE ALL ITEMS NOT EXAMINED]  Vital Signs: (As obtained by patient/caregiver or practitioner observation)    Blood pressure- 109/60 Heart rate-  68  Respiratory rate- 16   Temperature-97.8  Pulse oximetry-     Constitutional: [x] Appears well-developed and well-nourished [x] No apparent distress      [] Abnormal-   Mental status  [x] Alert and awake  [x] Oriented to person/place/time [x]Able to follow commands      Eyes:  EOM    [x]  Normal  [] Abnormal-  Sclera  [x]  Normal  [] Abnormal -         Discharge [x]  None visible  [] Abnormal -    HENT:   [x] Normocephalic, atraumatic.   [] Abnormal   [x] Mouth/Throat: Mucous membranes are moist.     External Ears [x] Normal  [] Abnormal-     Neck: [x] No visualized mass     Pulmonary/Chest: [x] Respiratory effort normal.  [] No visualized signs of difficulty breathing or respiratory distress        [] Abnormal-      Musculoskeletal:   [x] Normal gait with no signs of ataxia         [x] Normal range of motion of neck        [] Abnormal-       Neurological:        [x] No Facial Asymmetry (Cranial nerve 7 motor function) (limited exam to video visit)          [x] No gaze palsy        [] Abnormal-         Skin:        [x] No significant exanthematous lesions or discoloration noted on facial skin         [] Abnormal-            Psychiatric:       [x] Normal Affect [] No Hallucinations        [] Abnormal-     Other pertinent observable physical exam findings-     ASSESSMENT/PLAN:   Diagnosis Orders   1. Essential hypertension controlled on current regimen continue. amLODIPine (NORVASC) 5 MG tablet   2. Attention and concentration deficit improving after surgery. Patient continues to improve after surgery was in July 2019. Less problems with word finding. She functions independently now. Celexa controls anxiety. Patient has not gone for her official memory test yet. She will need this before considering return to work. COBIT 19 pandemic interfere with scheduling. citalopram (CELEXA) 20 MG tablet   3. Gastroesophageal reflux disease without esophagitis with good symptom control. famotidine (PEPCID) 20 MG tablet   4. Papular rash looks like healing shingles. It not been effective there since it has been over a week. May have been exacerbated by recent steroids for generalized Rash on the Anterior Chest. Patient history is significant for autoimmune urticaria. She was treated with Xolair for a while and has not taken since aneursym surgery one year ago. Will refer to dermatology  [facility] D[facility]       Arie Davenport is a 76 y.o. female being evaluated by a Virtual Visit (video visit) encounter to address concerns as mentioned above. A caregiver was present when appropriate. Due to this being a TeleHealth encounter (During ECQQU-86 public health emergency), evaluation of the following organ systems was limited: Vitals/Constitutional/EENT/Resp/CV/GI//MS/Neuro/Skin/Heme-Lymph-Imm.   Pursuant to the emergency declaration under the [facility], 1135 waiver authority and the [employer] Act, this Virtual Visit was conducted with patient's (and/or legal guardian's) consent, to reduce the patient's risk of exposure to COVID-19 and provide necessary medical care. The patient (and/or legal guardian) has also been advised to contact this office for worsening conditions or problems, and seek emergency medical treatment and/or call 911 if deemed necessary. Patient identification was verified at the start of the visit: Yes    Total time spent on this encounter: 25 minutes    Services were provided through a video synchronous discussion virtually to substitute for in-person clinic visit. Patient and provider were located at their individual homes. --Gustavo Silverman MD on 7/7/2020 at 1:46 PM    An electronic signature was used to authenticate this note.

## 2020-07-08 VITALS
RESPIRATION RATE: 16 BRPM | TEMPERATURE: 97.8 F | SYSTOLIC BLOOD PRESSURE: 109 MMHG | HEART RATE: 68 BPM | BODY MASS INDEX: 24.23 KG/M2 | DIASTOLIC BLOOD PRESSURE: 60 MMHG | HEIGHT: 69 IN | WEIGHT: 163.6 LBS

## 2020-07-08 ASSESSMENT — ENCOUNTER SYMPTOMS
BACK PAIN: 0
CHEST TIGHTNESS: 0
EYE PAIN: 0
VOMITING: 0
WHEEZING: 0
TROUBLE SWALLOWING: 0
NAUSEA: 0
APNEA: 0
BLOOD IN STOOL: 0
CHOKING: 0
ANAL BLEEDING: 0
SORE THROAT: 0
STRIDOR: 0
ABDOMINAL DISTENTION: 0
CONSTIPATION: 0
RHINORRHEA: 0
RECTAL PAIN: 0
COLOR CHANGE: 0
SINUS PAIN: 0
COUGH: 0
SHORTNESS OF BREATH: 0
SINUS PRESSURE: 0
DIARRHEA: 0
ABDOMINAL PAIN: 0

## 2020-08-21 RX ORDER — METHYLPREDNISOLONE 4 MG/1
TABLET ORAL
Qty: 1 KIT | Refills: 0 | Status: SHIPPED | OUTPATIENT
Start: 2020-08-21 | End: 2020-08-27

## 2020-09-02 PROBLEM — Z98.890 HX OF CEREBRAL ANEURYSM REPAIR: Status: ACTIVE | Noted: 2020-09-02

## 2020-09-02 PROBLEM — G31.84 MCI (MILD COGNITIVE IMPAIRMENT): Status: ACTIVE | Noted: 2020-09-02

## 2020-09-02 PROBLEM — Z86.79 HX OF CEREBRAL ANEURYSM REPAIR: Status: ACTIVE | Noted: 2020-09-02

## 2020-09-09 ENCOUNTER — VIRTUAL VISIT (OUTPATIENT)
Dept: PRIMARY CARE CLINIC | Age: 65
End: 2020-09-09
Payer: MEDICARE

## 2020-09-09 VITALS
DIASTOLIC BLOOD PRESSURE: 67 MMHG | SYSTOLIC BLOOD PRESSURE: 116 MMHG | RESPIRATION RATE: 16 BRPM | TEMPERATURE: 97.1 F | HEART RATE: 61 BPM | HEIGHT: 69 IN | BODY MASS INDEX: 24.05 KG/M2 | WEIGHT: 162.4 LBS

## 2020-09-09 PROCEDURE — 99214 OFFICE O/P EST MOD 30 MIN: CPT | Performed by: INTERNAL MEDICINE

## 2020-09-09 ASSESSMENT — ENCOUNTER SYMPTOMS
SHORTNESS OF BREATH: 0
WHEEZING: 0
SINUS PRESSURE: 0
ORTHOPNEA: 0
RECTAL PAIN: 0
BACK PAIN: 0
SORE THROAT: 0
BLOOD IN STOOL: 0
DIARRHEA: 0
ABDOMINAL DISTENTION: 0
COLOR CHANGE: 0
CHOKING: 0
ABDOMINAL PAIN: 0
CHEST TIGHTNESS: 0
COUGH: 0
TROUBLE SWALLOWING: 0
VOMITING: 0
ANAL BLEEDING: 0
SINUS PAIN: 0
CONSTIPATION: 0
BLURRED VISION: 0
EYE PAIN: 0
APNEA: 0
STRIDOR: 0
NAUSEA: 0
RHINORRHEA: 0

## 2020-09-09 NOTE — PROGRESS NOTES
2020   This is a video visit. Patient agrees to evaluation and treatment per this virtual modality. Patient is at home and I am at the office. Kimberli Pardo (:  1955) is a 76 y.o. female, here for evaluation of the following medical concerns:    Hypertension   This is a chronic problem. The current episode started more than 1 year ago. The problem is unchanged. The problem is controlled. Pertinent negatives include no anxiety, blurred vision, chest pain, headaches, malaise/fatigue, neck pain, orthopnea, palpitations, peripheral edema, PND, shortness of breath or sweats. There are no associated agents to hypertension. Past treatments include calcium channel blockers. The current treatment provides significant improvement. There is no history of angina, kidney disease, CAD/MI, CVA, heart failure or PVD. Sleeping well with melatonin getting 6-7 hours of sleep a night.     Patient Active Problem List   Diagnosis    Hives    Leg edema    TMJ (temporomandibular joint syndrome)    Breast mass, right    Vitamin D deficiency    Osteopenia    Candidiasis of mouth    Normal EEG    Seizure prophylaxis    Chronic hepatitis C without hepatic coma (HCC)    Moderate persistent asthma without complication    Essential hypertension    Immune to hepatitis B    Seasonal allergic rhinitis due to pollen    Urinary frequency    MCI (mild cognitive impairment) with memory loss    Aneurysm (HCC)    Gastroesophageal reflux disease without esophagitis    Thyroid nodule    Idiopathic urticaria    Hx of cerebral aneurysm repair     Allergies   Allergen Reactions    Aspirin Hives    Bimatoprost Hives    Lac Bovis Hives    Latanoprost Other (See Comments)     Headaches    Lisinopril Swelling    Macadamia Nut Oil      HIVES    Peanut-Containing Drug Products Hives    Salicylates      HIVES         Review of Systems   Constitutional: Negative for activity change, appetite change, chills, diaphoresis, fatigue, fever and malaise/fatigue. HENT: Negative for congestion, dental problem, drooling, ear discharge, ear pain, postnasal drip, rhinorrhea, sinus pressure, sinus pain, sneezing, sore throat, tinnitus and trouble swallowing. Eyes: Negative for blurred vision and pain. Respiratory: Negative for apnea, cough, choking, chest tightness, shortness of breath, wheezing and stridor. Asthma   Cardiovascular: Negative for chest pain, palpitations, orthopnea and PND. Hypertension. Gastrointestinal: Negative for abdominal distention, abdominal pain, anal bleeding, blood in stool, constipation, diarrhea, nausea, rectal pain and vomiting. Negative colonoscopy in 2013 and needs repeat in 2023   Endocrine: Negative. Genitourinary: Negative for decreased urine volume, difficulty urinating, dyspareunia, dysuria, enuresis, flank pain, frequency, genital sores, hematuria, menstrual problem, pelvic pain, urgency, vaginal bleeding and vaginal discharge. Menarche at age 14. Age of first child 21. Patient feed two children. Menopause at age 39. No family history of breast cancer. History of normal pap. Musculoskeletal: Negative for arthralgias, back pain, myalgias and neck pain. Skin: Negative for color change, pallor, rash and wound. Chronic hives under good control with no recent flareups on Xolair. Allergic/Immunologic:        Chronic Hives. Thought to be autoimmune and did well on xolair for years. Neurological: Negative. Negative for dizziness, tremors, seizures, syncope, facial asymmetry, speech difficulty, weakness, light-headedness, numbness and headaches. Aneurysm repair in July 2019 at [facility]   Hematological: Negative for adenopathy. Does not bruise/bleed easily.    Psychiatric/Behavioral: Negative for agitation, behavioral problems, confusion, decreased concentration, dysphoric mood, hallucinations, self-injury, sleep disturbance and (CALCIUM 600 + D) 600-400 MG-UNIT TABS Take  by mouth daily. Historical Provider, MD   Multiple Vitamin (MULTIVITAMIN PO) Take  by mouth daily. Historical Provider, MD        Social History     Tobacco Use    Smoking status: Former Smoker     Packs/day: 0.50     Years: 2.00     Pack years: 1.00     Types: Cigarettes     Last attempt to quit: 1982     Years since quittin.4    Smokeless tobacco: Never Used   Substance Use Topics    Alcohol use: Yes     Comment: rarely      HEPATITIS A IGG ANTIBODYResulted: 11/3/2015 7:13 PM  [facility]  Component Name Value Ref Range   Hepatitis A IgG Antibody Reactive (A)   Comment:  IgG anti-HAV detected. The presence of IgG anti-HAV implies past infection (recent or distant) or vaccination   against HAV. Detectable levels above the assay cut-off suggest immunity to HAV infection. Nonreactive    Specimen Collected on   SERUM 11/3/2015 3:44 PM   HEPATITIS B SURFACE ABResulted: 2019 9:04 PM  [facility]  Component Name Value Ref Range   Hep B S Ab Reactive (A)   Comment:  IgG anti-HBs detected. Presumptive evidence of HBV infection or immunization. Individual is considered immune to   HBV infection. Nonreactive    Specimen Collected on   SERUM 2019 4:38 PM     Care Everywhere Result Report  HEPATITIS B CORE ABResulted: 2019 9:03 PM  [facility]  Component Name Value Ref Range   Hep B Core Total Ab Nonreactive   Comment: IgG and IgM anti-HBc not detected. Nonreactive    Specimen Collected on   SERUM 2019 4:38 PM       Treated for hepatitis C in   There were no vitals filed for this visit. Estimated body mass index is 24.51 kg/m² as calculated from the following:    Height as of 20: 5' 8.5\" (1.74 m). Weight as of 20: 163 lb 9.6 oz (74.2 kg). Physical Exam  Constitutional:       General: She is not in acute distress. Appearance: Normal appearance. She is obese.  She is not ill-appearing, toxic-appearing or diaphoretic. HENT:      Nose: Nose normal.   Eyes:      Extraocular Movements: Extraocular movements intact. Conjunctiva/sclera: Conjunctivae normal.      Pupils: Pupils are equal, round, and reactive to light. Neck:      Comments: No visible masses. Pulmonary:      Effort: Pulmonary effort is normal.   Neurological:      General: No focal deficit present. Mental Status: She is alert and oriented to person, place, and time. Cranial Nerves: No cranial nerve deficit. Sensory: No sensory deficit. Motor: No weakness. Coordination: Coordination normal.      Gait: Gait normal.   Psychiatric:         Mood and Affect: Mood normal.         Behavior: Behavior normal.         Thought Content: Thought content normal.         Judgment: Judgment normal.         ASSESSMENT/PLAN:     Diagnosis Orders   1. Chronic idiopathic urticaria , stable on zyrtec and singulair, awaiting allergy appointment. External Referral To Allergy   2. Chronic hepatitis C without hepatic coma (HCC) RESOLVED AFTER TREATMENT, UPDATED PROBLEM LIST. 3. Essential hypertension   BP Readings from Last 3 Encounters:   09/09/20 116/67   07/07/20 109/60   05/01/20 116/77   controlled on current regimen. Lita Arce is a 76 y.o. female being evaluated by a Virtual Visit (video visit) encounter to address concerns as mentioned above. A caregiver was present when appropriate. Due to this being a TeleHealth encounter (During [place] [address] public health emergency), evaluation of the following organ systems was limited: Vitals/Constitutional/EENT/Resp/CV/GI//MS/Neuro/Skin/Heme-Lymph-Imm.   Pursuant to the emergency declaration under the [facility], [address] [place] [address] authority and the [employer] and [employer] Act, this Virtual Visit was conducted with patient's (and/or legal guardian's) consent, to reduce the patient's risk of exposure to COVID-19 and provide necessary medical care. The patient (and/or legal guardian) has also been advised to contact this office for worsening conditions or problems, and seek emergency medical treatment and/or call 911 if deemed necessary. Patient identification was verified at the start of the visit: Yes    Total time spent for this encounter: 25 minutes. Services were provided through a video synchronous discussion virtually to substitute for in-person clinic visit. Patient and provider were located at their individual homes. --Griselda Chapman, MD on 9/13/2020 at 7:53 PM    An electronic signature was used to authenticate this note. An electronic signature was used to authenticate this note.     --Griselda Chapman, MD on 9/9/2020 at 4:07 PM

## 2020-12-24 ENCOUNTER — OFFICE VISIT (OUTPATIENT)
Dept: PRIMARY CARE CLINIC | Age: 65
End: 2020-12-24
Payer: MEDICARE

## 2020-12-24 VITALS
BODY MASS INDEX: 24.45 KG/M2 | DIASTOLIC BLOOD PRESSURE: 83 MMHG | WEIGHT: 163.2 LBS | TEMPERATURE: 97.5 F | SYSTOLIC BLOOD PRESSURE: 124 MMHG | HEART RATE: 68 BPM | RESPIRATION RATE: 16 BRPM

## 2020-12-24 PROCEDURE — 99214 OFFICE O/P EST MOD 30 MIN: CPT | Performed by: INTERNAL MEDICINE

## 2020-12-24 RX ORDER — AMLODIPINE BESYLATE 5 MG/1
5 TABLET ORAL DAILY
Qty: 90 TABLET | Refills: 3 | Status: SHIPPED | OUTPATIENT
Start: 2020-12-24 | End: 2021-12-30 | Stop reason: SDUPTHER

## 2020-12-24 RX ORDER — ALBUTEROL SULFATE 90 UG/1
2 AEROSOL, METERED RESPIRATORY (INHALATION) EVERY 6 HOURS PRN
Qty: 3 INHALER | Refills: 3 | Status: SHIPPED | OUTPATIENT
Start: 2020-12-24 | End: 2021-08-27

## 2020-12-24 RX ORDER — FLUTICASONE PROPIONATE 50 MCG
1 SPRAY, SUSPENSION (ML) NASAL DAILY
Qty: 3 BOTTLE | Refills: 3 | Status: SHIPPED | OUTPATIENT
Start: 2020-12-24 | End: 2022-05-25 | Stop reason: SDUPTHER

## 2020-12-24 RX ORDER — FAMOTIDINE 20 MG/1
20 TABLET, FILM COATED ORAL 2 TIMES DAILY
Qty: 180 TABLET | Refills: 1 | Status: SHIPPED | OUTPATIENT
Start: 2020-12-24 | End: 2021-07-01 | Stop reason: SDUPTHER

## 2020-12-24 RX ORDER — EPINEPHRINE 0.3 MG/.3ML
0.3 INJECTION SUBCUTANEOUS
Qty: 1 EACH | Refills: 5 | Status: SHIPPED | OUTPATIENT
Start: 2020-12-24 | End: 2021-10-03 | Stop reason: SDUPTHER

## 2020-12-24 RX ORDER — MONTELUKAST SODIUM 10 MG/1
10 TABLET ORAL DAILY
Qty: 90 TABLET | Refills: 3 | Status: SHIPPED | OUTPATIENT
Start: 2020-12-24 | End: 2021-12-30 | Stop reason: SDUPTHER

## 2020-12-24 RX ORDER — CITALOPRAM 20 MG/1
20 TABLET ORAL DAILY
Qty: 90 TABLET | Refills: 3 | Status: SHIPPED | OUTPATIENT
Start: 2020-12-24 | End: 2021-12-30 | Stop reason: SDUPTHER

## 2020-12-24 RX ORDER — CETIRIZINE HYDROCHLORIDE 10 MG/1
10 TABLET ORAL 2 TIMES DAILY
Qty: 180 TABLET | Refills: 3 | Status: SHIPPED | OUTPATIENT
Start: 2020-12-24 | End: 2021-12-30 | Stop reason: SDUPTHER

## 2020-12-24 ASSESSMENT — ENCOUNTER SYMPTOMS
RHINORRHEA: 0
CHEST TIGHTNESS: 0
STRIDOR: 0
EYE PAIN: 0
COUGH: 0
BLOOD IN STOOL: 0
WHEEZING: 0
ANAL BLEEDING: 0
ABDOMINAL PAIN: 0
SHORTNESS OF BREATH: 0
SINUS PRESSURE: 0
RECTAL PAIN: 0
SINUS PAIN: 0
ABDOMINAL DISTENTION: 0
VOMITING: 0
APNEA: 0
DIARRHEA: 0
CONSTIPATION: 0
NAUSEA: 0
COLOR CHANGE: 0
SORE THROAT: 0
BACK PAIN: 0
CHOKING: 0
TROUBLE SWALLOWING: 0

## 2020-12-24 NOTE — PROGRESS NOTES
2020    TELEHEALTH EVALUATION -- Audio/Visual (During [place]-72 public health emergency)    HPI:  Needs pneumovax 23. Will get with next in person visit. Nargis Ferguson (:  1955) has requested an audio/video evaluation for the following concern(s):    Autoimmune Hives and started back on Xolair and starting with Dr. Willie Reinoso. Recently had follow up thyroid us for nodules and reviewed the below results. 2020  US-THYROID/NECK/HEAD12/15/2020  [facility]  Result Impression   IMPRESSION:    Stable thyroid nodules. Signed ByBrigette Randall MD, LELAND   Result Narrative   EXAM: THYROID ULTRASOUND    INDICATION: Thyroid nodule    COMPARISON: Thyroid ultrasound 19    FINDINGS:    RIGHT LOBE: 4.0 x 1.6 x 1.7 cm. Heterogeneous in echogenicity without focal nodule identified. LEFT LOBE: 4.3 x 1.8 x 2.0 cm. Lower pole isoechoic nodule 0.9 x 0.6 x 0.7 cm, compared to 0.8 x 0.5 x 0.6 cm. Lower pole isoechoic solid and minimally cystic nodule 1.5 x 0.9 x 1.3 cm, compared to 1.4 x 0.9 x 1.3 cm. ISTHMUS: 3 mm. OTHER FINDINGS: None. Other Result Information   Interface, Incoming Radiology Results - 12/15/2020 12:35 PM EST  EXAM: THYROID ULTRASOUND    INDICATION: Thyroid nodule    COMPARISON: Thyroid ultrasound 19    FINDINGS:    RIGHT LOBE: 4.0 x 1.6 x 1.7 cm. Heterogeneous in echogenicity without focal nodule identified. LEFT LOBE: 4.3 x 1.8 x 2.0 cm. Lower pole isoechoic nodule 0.9 x 0.6 x 0.7 cm, compared to 0.8 x 0.5 x 0.6 cm. Lower pole isoechoic solid and minimally cystic nodule 1.5 x 0.9 x 1.3 cm, compared to 1.4 x 0.9 x 1.3 cm. ISTHMUS: 3 mm. OTHER FINDINGS: None. IMPRESSION:    Stable thyroid nodules. Signed ByBrigette Randall MD, LELAND   Status      Diagnosis Orders   1. Essential hypertension stable on amlodipine 5 mg qd. No associated chf or renal disease. Present for years and stable.   BP Readings from Last 3 Encounters:   20 124/83   20 116/67 07/07/20 109/60       2. Moderate persistent asthma without complication  Stable on medication: Qvar and singulair. Present for years and stable. No complications. Needs refill on albuterol to keep on hand. beclomethasone (QVAR) 80 MCG/ACT inhaler   3. Vitamin D deficiency 1/24/2020 12:15 PM - [facility], [facility]    Component Value Ref Range & Units Status Collected Lab   25-Hydroxyvitamin D2 and D3 81High   30 - 80 ng/mL Final 01/15/2020 12:12 PM [address]   Therapy is based on     Stable. 4. Hives due to autoimmune etiology. Was off xolair for over a year when had cerebral aneurysm repair. Symptoms started 6 months off of medication and became severe requiring frequent rounds of steroids and on zyrtec 10 mg bid. Now is back with allergist and started Xolair and stable. 5. Gastroesophageal reflux disease without esophagitis  Is stable with pepcid without abdominal pain or nausea. 6. Attention and concentration deficit  And anxiety improving. 7. Other allergic rhinitis not symptomatic on zyrtec. Review of Systems   Constitutional: Negative for activity change, appetite change, chills, diaphoresis, fatigue and fever. HENT: Negative for congestion, dental problem, drooling, ear discharge, ear pain, postnasal drip, rhinorrhea, sinus pressure, sinus pain, sneezing, sore throat, tinnitus and trouble swallowing. Eyes: Negative for pain. Respiratory: Negative for apnea, cough, choking, chest tightness, shortness of breath, wheezing and stridor. Asthma   Cardiovascular: Negative for chest pain and palpitations. Hypertension. Gastrointestinal: Negative for abdominal distention, abdominal pain, anal bleeding, blood in stool, constipation, diarrhea, nausea, rectal pain and vomiting. Negative colonoscopy in 2013 and needs repeat in 2023   Endocrine: Negative.     Genitourinary: Negative for decreased urine volume, difficulty urinating, dyspareunia, dysuria, enuresis, flank pain, frequency, genital sores, hematuria, menstrual problem, pelvic pain, urgency, vaginal bleeding and vaginal discharge. Menarche at age 14. Age of first child 21. Patient feed two children. Menopause at age 39. No family history of breast cancer. History of normal pap. Musculoskeletal: Negative for arthralgias, back pain, myalgias and neck pain. Skin: Negative for color change, pallor, rash and wound. Chronic hives under good control with no recent flareups on Xolair. Allergic/Immunologic:        Chronic Hives. Thought to be autoimmune and did well on xolair for years. Neurological: Negative. Negative for dizziness, tremors, seizures, syncope, facial asymmetry, speech difficulty, weakness, light-headedness, numbness and headaches. Aneurysm repair in July 2019 at [facility]   Hematological: Negative for adenopathy. Does not bruise/bleed easily. Psychiatric/Behavioral: Negative for agitation, behavioral problems, confusion, decreased concentration, dysphoric mood, hallucinations, self-injury, sleep disturbance and suicidal ideas. The patient is not nervous/anxious and is not hyperactive. .  Patient is sleepy during the day and awake all night and recently started on melatonin 5 mg nightly per neurosurgery. Prior to Visit Medications    Medication Sig Taking?  Authorizing Provider   EPINEPHrine (EPIPEN) 0.3 MG/0.3ML SOAJ injection Inject 0.3 mg into the skin once as needed  Historical Provider, MD   amLODIPine (NORVASC) 5 MG tablet Take 1 tablet by mouth daily  Latesha Nguyễn MD   famotidine (PEPCID) 20 MG tablet Take 1 tablet by mouth 2 times daily  Latesha Nguyễn MD   citalopram (CELEXA) 20 MG tablet Take 1 tablet by mouth daily  Latesha Nguyễn MD   cetirizine (ZYRTEC) 10 MG tablet Take 1 tablet by mouth 2 times daily  Latesha Nguyễn MD   montelukast (SINGULAIR) 10 MG tablet Take 1 tablet by mouth daily Consuelo Cesar MD   fluticasone (FLONASE) 50 MCG/ACT nasal spray 1 spray by Each Nostril route daily 1 Spray in each nostril  Consuelo Cesar MD   albuterol sulfate HFA (PROVENTIL HFA) 108 (90 Base) MCG/ACT inhaler Inhale 2 puffs into the lungs every 6 hours as needed for Wheezing  Consuelo Cesar MD   beclomethasone (QVAR) 80 MCG/ACT inhaler Inhale 1 puff into the lungs 2 times daily  Consuelo Cesar MD   ZIOPTAN 0.0015 % SOLN   Historical Provider, MD   Cholecalciferol (VITAMIN D3) 125 MCG (5000 UT) CAPS Take 1 capsule by mouth three times a week  Consuelo Cesar MD   mometasone (ELOCON) 0.1 % cream Apply topically daily. Consuelo Cesar MD   tafluprost (ZIOPTAN) 0.0015 % SOLN ophthalmic drops 1 drop every evening  Historical Provider, MD   albuterol sulfate HFA (VENTOLIN HFA) 108 (90 Base) MCG/ACT inhaler INHALE TWO PUFFS BY MOUTH EVERY 6 HOURS AS NEEDED FOR WHEEZING  Consuelo Cesar MD   timolol (TIMOPTIC) 0.5 % ophthalmic solution Place 1 drop into both eyes daily. Historical Provider, MD   Calcium Carbonate-Vitamin D (CALCIUM 600 + D) 600-400 MG-UNIT TABS Take  by mouth daily. Historical Provider, MD   Multiple Vitamin (MULTIVITAMIN PO) Take  by mouth daily.     Historical Provider, MD       Social History     Tobacco Use    Smoking status: Former Smoker     Packs/day: 0.50     Years: 2.00     Pack years: 1.00     Types: Cigarettes     Quit date: 1982     Years since quittin.6    Smokeless tobacco: Never Used   Substance Use Topics    Alcohol use: Yes     Comment: rarely    Drug use: No        Allergies   Allergen Reactions    Aspirin Hives    Bimatoprost Hives    Lac Bovis Hives    Latanoprost Other (See Comments)     Headaches    Lisinopril Swelling    Macadamia Nut Oil      HIVES    Peanut-Containing Drug Products Hives    Salicylates      HIVES   ,   Past Medical History:   Diagnosis Date    Allergic rhinitis     Asthma     Gastroesophageal reflux disease without esophagitis 7/10/2019    Hypertension     Seizures ([address]     after head trauma after passing out and hit her head.       Urticaria, idiopathic    ,   Past Surgical History:   Procedure Laterality Date    CHOLECYSTECTOMY      COLONOSCOPY      EYE SURGERY      lasik   ,   Social History     Tobacco Use    Smoking status: Former Smoker     Packs/day: 0.50     Years: 2.00     Pack years: 1.00     Types: Cigarettes     Quit date: 1982     Years since quittin.6    Smokeless tobacco: Never Used   Substance Use Topics    Alcohol use: Yes     Comment: rarely    Drug use: No   ,   Family History   Problem Relation Age of Onset    Arthritis Mother     High Blood Pressure Mother     High Cholesterol Mother     High Blood Pressure Sister    ,   Immunization History   Administered Date(s) Administered    Hepatitis A 2011    Hepatitis B 2011    Influenza A (X8D1-15) Vaccine PF IM 2009    Influenza Vaccine, unspecified formulation 2015, 10/01/2016    Influenza Virus Vaccine 2011, 10/17/2013    Influenza Whole 10/17/2013, 2016    Influenza, High Dose (Fluzone 65 yrs and older) 10/11/2019    Influenza, MDCK, Preservative free 2015    Influenza, Quadv, IM, PF (6 mo and older Fluzone, Flulaval, Fluarix, and 3 yrs and older Afluria) 10/14/2018    Influenza, Quadv, adjuvanted, 65 yrs +, IM, PF (Fluad) 10/15/2020    Pneumococcal Conjugate 13-valent ([id number]) 2019    Pneumococcal Conjugate Vaccine 2015    Pneumococcal Polysaccharide ([id number]) 2015    Zoster Live (Zostavax) 2016    Zoster Recombinant (Shingrix) 2019, 2019   ,   Health Maintenance   Topic Date Due    DTaP/Tdap/Td vaccine (1 - Tdap) 1973    Annual Wellness Visit (AWV)  2020    Pneumococcal 65+ years Vaccine (2 of 2 - PPSV23) 2020    Hepatitis A vaccine (2 of 2 - Risk 2-dose series) 2021 (Originally 2/22/2012)    Hepatitis B vaccine (2 of 3 - Risk 3-dose series) 09/09/2021 (Originally 9/19/2011)    Potassium monitoring  01/15/2021    Creatinine monitoring  01/15/2021    Breast cancer screen  01/31/2021    Colon cancer screen colonoscopy  02/28/2023    Lipid screen  01/15/2025    DEXA (modify frequency per FRAX score)  Completed    Flu vaccine  Completed    Shingles Vaccine  Completed    Hib vaccine  Aged Out    Meningococcal (ACWY) vaccine  Aged Out       PHYSICAL EXAMINATION:  [ INSTRUCTIONS:  \"[x]\" Indicates a positive item  \"[]\" Indicates a negative item  -- DELETE ALL ITEMS NOT EXAMINED]  Vital Signs: (As obtained by patient/caregiver or practitioner observation)    Blood pressure-  Heart rate-    Respiratory rate-    Temperature-  Pulse oximetry-     Constitutional: [x] Appears well-developed and well-nourished [] No apparent distress      [] Abnormal-   Mental status  [x] Alert and awake  [x] Oriented to person/place/time []Able to follow commands      Eyes:  EOM    [x]  Normal  [] Abnormal-  Sclera  [x]  Normal  [] Abnormal -         Discharge [x]  None visible  [] Abnormal -    HENT:   [x] Normocephalic, atraumatic.   [] Abnormal   [x] Mouth/Throat: Mucous membranes are moist.     External Ears [x] Normal  [] Abnormal-     Neck: [x] No visualized mass     Pulmonary/Chest: [x] Respiratory effort normal.  [x] No visualized signs of difficulty breathing or respiratory distress        [] Abnormal-      Musculoskeletal:   [x] Normal gait with no signs of ataxia         [x] Normal range of motion of neck        [] Abnormal-       Neurological:        [x] No Facial Asymmetry (Cranial nerve 7 motor function) (limited exam to video visit)          [x] No gaze palsy        [] Abnormal-         Skin:        [x] No significant exanthematous lesions or discoloration noted on facial skin         [] Abnormal-            Psychiatric:       [x] Normal Affect [] No Hallucinations        [] Abnormal- Other pertinent observable physical exam findings-     ASSESSMENT/PLAN:   Diagnosis Orders   1. Essential hypertension controlled on medication. amLODIPine (NORVASC) 5 MG tablet   2. Moderate persistent asthma without complication stable on current med's. montelukast (SINGULAIR) 10 MG tablet    albuterol sulfate HFA (PROVENTIL HFA) 108 (90 Base) MCG/ACT inhaler    beclomethasone (QVAR) 80 MCG/ACT inhaler   3. Vitamin D deficiency resolved with level of 80.    4. Hives , autoimmune, on xolar, keep epi prn. EPINEPHrine (EPIPEN) 0.3 MG/0.3ML SOAJ injection   5. Gastroesophageal reflux disease without esophagitis no longer symptomatic. famotidine (PEPCID) 20 MG tablet   6. Attention and concentration deficit , stable. citalopram (CELEXA) 20 MG tablet   7. Other allergic rhinitis  cetirizine (ZYRTEC) 10 MG tablet    fluticasone (FLONASE) 50 MCG/ACT nasal spray       Loyda Cote is a 76 y.o. female being evaluated by a Virtual Visit (video visit) encounter to address concerns as mentioned above. A caregiver was present when appropriate. Due to this being a TeleHealth encounter (During [facility] public health emergency), evaluation of the following organ systems was limited: Vitals/Constitutional/EENT/Resp/CV/GI//MS/Neuro/Skin/Heme-Lymph-Imm. Pursuant to the emergency declaration under the [address] [place] [address], [address] [place] [address] and the [employer] and [employer] Act, this Virtual Visit was conducted with patient's (and/or legal guardian's) consent, to reduce the patient's risk of exposure to COVID-19 and provide necessary medical care. The patient (and/or legal guardian) has also been advised to contact this office for worsening conditions or problems, and seek emergency medical treatment and/or call 911 if deemed necessary.      Patient identification was verified at the start of the visit: Yes    Total time spent on this encounter: 25 minutes    Services were provided through a video synchronous discussion virtually to substitute for in-person clinic visit. Patient and provider were located at their individual homes. --Deborah Shabazz MD on 12/24/2020 at 9:14 AM    An electronic signature was used to authenticate this note.

## 2021-01-05 DIAGNOSIS — L20.82 FLEXURAL ECZEMA: ICD-10-CM

## 2021-01-05 RX ORDER — MOMETASONE FUROATE 1 MG/G
CREAM TOPICAL
Qty: 60 G | Refills: 2 | Status: SHIPPED | OUTPATIENT
Start: 2021-01-05

## 2021-01-05 NOTE — TELEPHONE ENCOUNTER
Medication:   Requested Prescriptions     Pending Prescriptions Disp Refills    mometasone (ELOCON) 0.1 % cream [Pharmacy Med Name: MOMETASONE FUROATE 0.1% CREAM]  2     Sig: APPLY TOPICALLY DAILY        Last Filled:      Patient Phone Number: [phone] (home) [phone] (work)    Last appt: 12/24/2020   Next appt: Visit date not found    Last OARRS: No flowsheet data found.

## 2021-02-26 ENCOUNTER — OFFICE VISIT (OUTPATIENT)
Dept: INTERNAL MEDICINE CLINIC | Age: 66
End: 2021-02-26
Payer: MEDICARE

## 2021-02-26 VITALS
HEART RATE: 65 BPM | OXYGEN SATURATION: 94 % | WEIGHT: 169.8 LBS | BODY MASS INDEX: 25.44 KG/M2 | DIASTOLIC BLOOD PRESSURE: 72 MMHG | TEMPERATURE: 97.5 F | SYSTOLIC BLOOD PRESSURE: 136 MMHG

## 2021-02-26 DIAGNOSIS — K21.9 GASTROESOPHAGEAL REFLUX DISEASE WITHOUT ESOPHAGITIS: ICD-10-CM

## 2021-02-26 DIAGNOSIS — E04.1 THYROID NODULE: ICD-10-CM

## 2021-02-26 DIAGNOSIS — I10 ESSENTIAL HYPERTENSION: Primary | ICD-10-CM

## 2021-02-26 DIAGNOSIS — Z98.890 HX OF CEREBRAL ANEURYSM REPAIR: ICD-10-CM

## 2021-02-26 DIAGNOSIS — E55.9 VITAMIN D DEFICIENCY: ICD-10-CM

## 2021-02-26 DIAGNOSIS — R60.0 LEG EDEMA: ICD-10-CM

## 2021-02-26 DIAGNOSIS — Z86.79 HX OF CEREBRAL ANEURYSM REPAIR: ICD-10-CM

## 2021-02-26 DIAGNOSIS — J30.1 SEASONAL ALLERGIC RHINITIS DUE TO POLLEN: ICD-10-CM

## 2021-02-26 DIAGNOSIS — M85.88 OSTEOPENIA OF LUMBAR SPINE: ICD-10-CM

## 2021-02-26 DIAGNOSIS — L50.1 CHRONIC IDIOPATHIC URTICARIA: ICD-10-CM

## 2021-02-26 DIAGNOSIS — J45.40 MODERATE PERSISTENT ASTHMA WITHOUT COMPLICATION: ICD-10-CM

## 2021-02-26 PROBLEM — Z78.9 IMMUNE TO HEPATITIS B: Status: RESOLVED | Noted: 2019-06-07 | Resolved: 2021-02-26

## 2021-02-26 PROBLEM — I72.9 ANEURYSM (HCC): Status: RESOLVED | Noted: 2020-02-20 | Resolved: 2021-02-26

## 2021-02-26 PROBLEM — R35.0 URINARY FREQUENCY: Status: RESOLVED | Noted: 2019-08-09 | Resolved: 2021-02-26

## 2021-02-26 PROCEDURE — 99215 OFFICE O/P EST HI 40 MIN: CPT | Performed by: INTERNAL MEDICINE

## 2021-02-26 ASSESSMENT — PATIENT HEALTH QUESTIONNAIRE - PHQ9
SUM OF ALL RESPONSES TO PHQ QUESTIONS 1-9: 0
SUM OF ALL RESPONSES TO PHQ9 QUESTIONS 1 & 2: 0
SUM OF ALL RESPONSES TO PHQ QUESTIONS 1-9: 0

## 2021-02-26 NOTE — ASSESSMENT & PLAN NOTE
Well-controlled on daily inhaled steroid. Rarely required albuterol MDI outside of exercise. Continue current regimen. She will follow up if rescue inhaler required >2x/week.

## 2021-02-26 NOTE — ASSESSMENT & PLAN NOTE
Continue vitamin D supplement at current dose, high calcium diet and encouraged to increase weight-bearing exercise.

## 2021-02-26 NOTE — PROGRESS NOTES
Date of Visit:  2021    CC: Srikanth Carrillo (: 1955) is a 76 y.o. female, new patient, here to establish care and for evaluation of the following medical concerns:    ASSESSMENT/PLAN:  1. Essential hypertension  Assessment & Plan:  Well-controlled. Continue current antihypertensive regimen. Orders:  -     Lipid, Fasting; Future  -     Comprehensive Metabolic Panel, Fasting; Future  2. Chronic idiopathic urticaria  Assessment & Plan:  Control improving with restart of [place] in  per Dr. Dorys Lewis. Also taking Singulair, Zyrtec, and Benadryl chronically. Continue current regimen and regular follow up with allergist.  Orders:  -     CBC Auto Differential; Future  3. Leg edema  Assessment & Plan:  Mild. Responds to elevation of legs. Will prescribe compression stockings if symptoms worsen. 4. Osteopenia of lumbar spine  Assessment & Plan:  Continue vitamin D supplement at current dose, high calcium diet and encouraged to increase weight-bearing exercise. Orders:  -     DEXA BONE DENSITY AXIAL SKELETON; Future  5. Moderate persistent asthma without complication  Assessment & Plan:  Well-controlled on daily inhaled steroid. Rarely required albuterol MDI outside of exercise. Continue current regimen. She will follow up if rescue inhaler required >2x/week. 6. Seasonal allergic rhinitis due to pollen  Assessment & Plan:  Currently well-controlled on regimen for CIU- continue. 7. Hx of cerebral aneurysm repair  Assessment & Plan:  Mild cognitive impairment and circadian rhythm disturbance slowly improving with time. No new neurologic deficits. She was encouraged to continue regular follow up with neurosurgery. 8. Gastroesophageal reflux disease without esophagitis  Assessment & Plan:  Adequately controlled on OTC Pepcid- continue. 9. Thyroid nodule  Assessment & Plan:  Asymptomatic. Recent US stable. Repeat US in  unless new symptoms develop.   Orders:  -     TSH with Reflex; Future  10. Vitamin D deficiency  -     Vitamin D 25 Hydroxy; Future     Return in about 6 months (around 8/26/2021) for MEDICARE AWV. Vitals:    02/26/21 1136 02/26/21 1151   BP: (!) 120/58 136/72   Pulse: 65    Temp: 97.5 °F (36.4 °C)    TempSrc: Temporal    SpO2: 94%    Weight: 169 lb 12.8 oz (77 kg)       Estimated body mass index is 25.44 kg/m² as calculated from the following:    Height as of 9/9/20: 5' 8.5\" (1.74 m). Weight as of this encounter: 169 lb 12.8 oz (77 kg). Wt Readings from Last 3 Encounters:   02/26/21 169 lb 12.8 oz (77 kg)   12/24/20 163 lb 3.2 oz (74 kg)   09/09/20 162 lb 6.4 oz (73.7 kg)     BP Readings from Last 3 Encounters:   02/26/21 136/72   12/24/20 124/83   09/09/20 116/67     HPI  See A/P    ROS  As documented above    Physical Exam  Constitutional:       General: She is not in acute distress. Appearance: She is well-developed. Eyes:      Conjunctiva/sclera: Conjunctivae normal.   Neck:      Thyroid: No thyroid mass or thyromegaly. Cardiovascular:      Rate and Rhythm: Normal rate and regular rhythm. Heart sounds: Normal heart sounds. No murmur. No friction rub. No gallop. Pulmonary:      Effort: Pulmonary effort is normal. No respiratory distress. Breath sounds: Normal breath sounds. No wheezing, rhonchi or rales. Lymphadenopathy:      Cervical: No cervical adenopathy. Skin:     General: Skin is warm and dry. Findings: No erythema or rash. Neurological:      Mental Status: She is alert and oriented to person, place, and time. Psychiatric:         Speech: Speech normal.         Behavior: Behavior normal.         Thought Content:  Thought content normal.         Judgment: Judgment normal.       On this date 02/26/21 I have spent 60 minutes reviewing previous notes, test results and face to face with the patient discussing the diagnosis and importance of compliance with the treatment plan as well as documenting on the day of the visit.    --Soy Acevedo MD on 2/26/2021 at 1:01 PM    An electronic signature was used to authenticate this note.

## 2021-02-26 NOTE — ASSESSMENT & PLAN NOTE
Mild cognitive impairment and circadian rhythm disturbance slowly improving with time. No new neurologic deficits. She was encouraged to continue regular follow up with neurosurgery.

## 2021-04-07 ENCOUNTER — HOSPITAL ENCOUNTER (OUTPATIENT)
Dept: MAMMOGRAPHY | Age: 66
Discharge: HOME | End: 2021-04-07
Payer: MEDICARE

## 2021-04-07 ENCOUNTER — HOSPITAL ENCOUNTER (OUTPATIENT)
Dept: GENERAL RADIOLOGY | Age: 66
Discharge: HOME | End: 2021-04-07
Payer: MEDICARE

## 2021-04-07 DIAGNOSIS — E04.1 THYROID NODULE: ICD-10-CM

## 2021-04-07 DIAGNOSIS — L50.1 CHRONIC IDIOPATHIC URTICARIA: ICD-10-CM

## 2021-04-07 DIAGNOSIS — Z12.31 VISIT FOR SCREENING MAMMOGRAM: ICD-10-CM

## 2021-04-07 DIAGNOSIS — I10 ESSENTIAL HYPERTENSION: ICD-10-CM

## 2021-04-07 DIAGNOSIS — E55.9 VITAMIN D DEFICIENCY: ICD-10-CM

## 2021-04-07 DIAGNOSIS — M85.88 OSTEOPENIA OF LUMBAR SPINE: ICD-10-CM

## 2021-04-07 PROBLEM — E03.8 SUBCLINICAL HYPOTHYROIDISM: Status: ACTIVE | Noted: 2021-04-07

## 2021-04-07 PROBLEM — D70.8 OTHER NEUTROPENIA (HCC): Status: ACTIVE | Noted: 2021-04-07

## 2021-04-07 PROBLEM — D64.9 ANEMIA: Status: ACTIVE | Noted: 2021-04-07

## 2021-04-07 LAB
A/G RATIO: 1.4 (ref 1.1–2.2)
ALBUMIN SERPL-MCNC: 4 G/DL (ref 3.4–5)
ALP BLD-CCNC: 68 U/L (ref 40–129)
ALT SERPL-CCNC: 11 U/L (ref 10–40)
ANION GAP SERPL CALCULATED.3IONS-SCNC: 8 MMOL/L (ref 3–16)
AST SERPL-CCNC: 22 U/L (ref 15–37)
BASOPHILS ABSOLUTE: 0 K/UL (ref 0–0.2)
BASOPHILS RELATIVE PERCENT: 0.4 %
BILIRUB SERPL-MCNC: 0.4 MG/DL (ref 0–1)
BUN BLDV-MCNC: 9 MG/DL (ref 7–20)
CALCIUM SERPL-MCNC: 9.1 MG/DL (ref 8.3–10.6)
CHLORIDE BLD-SCNC: 106 MMOL/L (ref 99–110)
CHOLESTEROL, FASTING: 178 MG/DL (ref 0–199)
CO2: 29 MMOL/L (ref 21–32)
CREAT SERPL-MCNC: 0.7 MG/DL (ref 0.6–1.2)
EOSINOPHILS ABSOLUTE: 0 K/UL (ref 0–0.6)
EOSINOPHILS RELATIVE PERCENT: 0 %
GFR AFRICAN AMERICAN: >60
GFR NON-AFRICAN AMERICAN: >60
GLOBULIN: 2.8 G/DL
GLUCOSE FASTING: 92 MG/DL (ref 70–99)
HCT VFR BLD CALC: 35.6 % (ref 36–48)
HDLC SERPL-MCNC: 65 MG/DL (ref 40–60)
HEMOGLOBIN: 11.7 G/DL (ref 12–16)
LDL CHOLESTEROL CALCULATED: 100 MG/DL
LYMPHOCYTES ABSOLUTE: 1.3 K/UL (ref 1–5.1)
LYMPHOCYTES RELATIVE PERCENT: 46.2 %
MCH RBC QN AUTO: 27.5 PG (ref 26–34)
MCHC RBC AUTO-ENTMCNC: 32.8 G/DL (ref 31–36)
MCV RBC AUTO: 83.8 FL (ref 80–100)
MONOCYTES ABSOLUTE: 0.3 K/UL (ref 0–1.3)
MONOCYTES RELATIVE PERCENT: 11.6 %
NEUTROPHILS ABSOLUTE: 1.2 K/UL (ref 1.7–7.7)
NEUTROPHILS RELATIVE PERCENT: 41.8 %
PDW BLD-RTO: 15.3 % (ref 12.4–15.4)
PLATELET # BLD: 238 K/UL (ref 135–450)
PMV BLD AUTO: 9.2 FL (ref 5–10.5)
POTASSIUM SERPL-SCNC: 4 MMOL/L (ref 3.5–5.1)
RBC # BLD: 4.25 M/UL (ref 4–5.2)
SODIUM BLD-SCNC: 143 MMOL/L (ref 136–145)
T4 FREE: 1 NG/DL (ref 0.9–1.8)
TOTAL PROTEIN: 6.8 G/DL (ref 6.4–8.2)
TRIGLYCERIDE, FASTING: 65 MG/DL (ref 0–150)
TSH REFLEX: 8.07 UIU/ML (ref 0.27–4.2)
VITAMIN D 25-HYDROXY: 93.2 NG/ML
VLDLC SERPL CALC-MCNC: 13 MG/DL
WBC # BLD: 2.8 K/UL (ref 4–11)

## 2021-04-07 PROCEDURE — 77067 SCR MAMMO BI INCL CAD: CPT

## 2021-04-07 PROCEDURE — 77080 DXA BONE DENSITY AXIAL: CPT

## 2021-07-01 ENCOUNTER — PATIENT MESSAGE (OUTPATIENT)
Dept: INTERNAL MEDICINE CLINIC | Age: 66
End: 2021-07-01

## 2021-07-01 DIAGNOSIS — K21.9 GASTROESOPHAGEAL REFLUX DISEASE WITHOUT ESOPHAGITIS: ICD-10-CM

## 2021-07-01 RX ORDER — FAMOTIDINE 20 MG/1
20 TABLET, FILM COATED ORAL 2 TIMES DAILY
Qty: 180 TABLET | Refills: 1 | Status: SHIPPED | OUTPATIENT
Start: 2021-07-01 | End: 2021-12-26 | Stop reason: SDUPTHER

## 2021-07-01 NOTE — TELEPHONE ENCOUNTER
From: Laurence Vasques  To: Meggan Wilson MD  Sent: 7/1/2021 1:13 PM EDT  Subject: Prescription Question    Hi Dr. Sallie Humphrey,    I need a refill for my medicine Famotidine 20 MG tablets. I take one tablet by mouth twice a day. They are 90 day refills. I use the [facility] on [address], pharmacy phone [phone].     Thanks in advance for your help,    Rashad Strauss

## 2021-08-27 ENCOUNTER — OFFICE VISIT (OUTPATIENT)
Dept: INTERNAL MEDICINE CLINIC | Age: 66
End: 2021-08-27
Payer: MEDICARE

## 2021-08-27 VITALS
SYSTOLIC BLOOD PRESSURE: 138 MMHG | WEIGHT: 171 LBS | BODY MASS INDEX: 25.91 KG/M2 | RESPIRATION RATE: 16 BRPM | HEIGHT: 68 IN | DIASTOLIC BLOOD PRESSURE: 78 MMHG | OXYGEN SATURATION: 98 % | HEART RATE: 71 BPM

## 2021-08-27 DIAGNOSIS — E55.9 VITAMIN D DEFICIENCY: ICD-10-CM

## 2021-08-27 DIAGNOSIS — Z23 NEED FOR PROPHYLACTIC VACCINATION AGAINST STREPTOCOCCUS PNEUMONIAE (PNEUMOCOCCUS): ICD-10-CM

## 2021-08-27 DIAGNOSIS — Z00.00 ROUTINE GENERAL MEDICAL EXAMINATION AT A HEALTH CARE FACILITY: Primary | ICD-10-CM

## 2021-08-27 DIAGNOSIS — E03.8 SUBCLINICAL HYPOTHYROIDISM: ICD-10-CM

## 2021-08-27 DIAGNOSIS — D64.9 ANEMIA, UNSPECIFIED TYPE: ICD-10-CM

## 2021-08-27 PROCEDURE — G0438 PPPS, INITIAL VISIT: HCPCS | Performed by: INTERNAL MEDICINE

## 2021-08-27 PROCEDURE — G0009 ADMIN PNEUMOCOCCAL VACCINE: HCPCS | Performed by: INTERNAL MEDICINE

## 2021-08-27 PROCEDURE — 90732 PPSV23 VACC 2 YRS+ SUBQ/IM: CPT | Performed by: INTERNAL MEDICINE

## 2021-08-27 SDOH — ECONOMIC STABILITY: HOUSING INSECURITY
IN THE LAST 12 MONTHS, WAS THERE A TIME WHEN YOU DID NOT HAVE A STEADY PLACE TO SLEEP OR SLEPT IN A SHELTER (INCLUDING NOW)?: NO

## 2021-08-27 SDOH — ECONOMIC STABILITY: TRANSPORTATION INSECURITY
IN THE PAST 12 MONTHS, HAS THE LACK OF TRANSPORTATION KEPT YOU FROM MEDICAL APPOINTMENTS OR FROM GETTING MEDICATIONS?: NO

## 2021-08-27 SDOH — ECONOMIC STABILITY: FOOD INSECURITY: WITHIN THE PAST 12 MONTHS, THE FOOD YOU BOUGHT JUST DIDN'T LAST AND YOU DIDN'T HAVE MONEY TO GET MORE.: NEVER TRUE

## 2021-08-27 SDOH — ECONOMIC STABILITY: INCOME INSECURITY: IN THE LAST 12 MONTHS, WAS THERE A TIME WHEN YOU WERE NOT ABLE TO PAY THE MORTGAGE OR RENT ON TIME?: NO

## 2021-08-27 SDOH — ECONOMIC STABILITY: TRANSPORTATION INSECURITY
IN THE PAST 12 MONTHS, HAS LACK OF TRANSPORTATION KEPT YOU FROM MEETINGS, WORK, OR FROM GETTING THINGS NEEDED FOR DAILY LIVING?: NO

## 2021-08-27 SDOH — ECONOMIC STABILITY: FOOD INSECURITY: WITHIN THE PAST 12 MONTHS, YOU WORRIED THAT YOUR FOOD WOULD RUN OUT BEFORE YOU GOT MONEY TO BUY MORE.: NEVER TRUE

## 2021-08-27 ASSESSMENT — LIFESTYLE VARIABLES
HOW OFTEN DO YOU HAVE A DRINK CONTAINING ALCOHOL: 1
HOW OFTEN DO YOU HAVE SIX OR MORE DRINKS ON ONE OCCASION: 0
HOW OFTEN DURING THE LAST YEAR HAVE YOU NEEDED AN ALCOHOLIC DRINK FIRST THING IN THE MORNING TO GET YOURSELF GOING AFTER A NIGHT OF HEAVY DRINKING: 0
HOW MANY STANDARD DRINKS CONTAINING ALCOHOL DO YOU HAVE ON A TYPICAL DAY: 0
HOW OFTEN DURING THE LAST YEAR HAVE YOU FAILED TO DO WHAT WAS NORMALLY EXPECTED FROM YOU BECAUSE OF DRINKING: 0
HAS A RELATIVE, FRIEND, DOCTOR, OR ANOTHER HEALTH PROFESSIONAL EXPRESSED CONCERN ABOUT YOUR DRINKING OR SUGGESTED YOU CUT DOWN: 0
AUDIT TOTAL SCORE: 1
HOW OFTEN DURING THE LAST YEAR HAVE YOU FOUND THAT YOU WERE NOT ABLE TO STOP DRINKING ONCE YOU HAD STARTED: 0
HOW OFTEN DURING THE LAST YEAR HAVE YOU HAD A FEELING OF GUILT OR REMORSE AFTER DRINKING: 0
AUDIT-C TOTAL SCORE: 1
HOW OFTEN DURING THE LAST YEAR HAVE YOU BEEN UNABLE TO REMEMBER WHAT HAPPENED THE NIGHT BEFORE BECAUSE YOU HAD BEEN DRINKING: 0
HAVE YOU OR SOMEONE ELSE BEEN INJURED AS A RESULT OF YOUR DRINKING: 0

## 2021-08-27 ASSESSMENT — PATIENT HEALTH QUESTIONNAIRE - PHQ9
SUM OF ALL RESPONSES TO PHQ9 QUESTIONS 1 & 2: 0
1. LITTLE INTEREST OR PLEASURE IN DOING THINGS: 0
SUM OF ALL RESPONSES TO PHQ QUESTIONS 1-9: 0
2. FEELING DOWN, DEPRESSED OR HOPELESS: 0
SUM OF ALL RESPONSES TO PHQ QUESTIONS 1-9: 0
SUM OF ALL RESPONSES TO PHQ QUESTIONS 1-9: 0

## 2021-08-27 ASSESSMENT — SOCIAL DETERMINANTS OF HEALTH (SDOH): HOW HARD IS IT FOR YOU TO PAY FOR THE VERY BASICS LIKE FOOD, HOUSING, MEDICAL CARE, AND HEATING?: NOT HARD AT ALL

## 2021-08-27 NOTE — PATIENT INSTRUCTIONS
Make appointment at pharmacy for Tdap and hepatitis A #2  Flu shot in September or October    Personalized Preventive Plan for Patricia Hassan - 8/27/2021  Medicare offers a range of preventive health benefits. Some of the tests and screenings are paid in full while other may be subject to a deductible, co-insurance, and/or copay. Some of these benefits include a comprehensive review of your medical history including lifestyle, illnesses that may run in your family, and various assessments and screenings as appropriate. After reviewing your medical record and screening and assessments performed today your provider may have ordered immunizations, labs, imaging, and/or referrals for you. A list of these orders (if applicable) as well as your Preventive Care list are included within your After Visit Summary for your review. Other Preventive Recommendations:    · A preventive eye exam performed by an eye specialist is recommended every 1-2 years to screen for glaucoma; cataracts, macular degeneration, and other eye disorders. · A preventive dental visit is recommended every 6 months. · Try to get at least 150 minutes of exercise per week or 10,000 steps per day on a pedometer . · Order or download the FREE \"Exercise & Physical Activity: Your Everyday Guide\" from [employer]. Call 1-587.875.3779 or search [employer] online. · You need 0494-4213 mg of calcium and 2078-6601 IU of vitamin D per day. It is possible to meet your calcium requirement with diet alone, but a vitamin D supplement is usually necessary to meet this goal.  · When exposed to the sun, use a sunscreen that protects against both UVA and UVB radiation with an SPF of 30 or greater. Reapply every 2 to 3 hours or after sweating, drying off with a towel, or swimming. · Always wear a seat belt when traveling in a car. Always wear a helmet when riding a bicycle or motorcycle.     Heart-Healthy Diet   Sodium, Fat, and Cholesterol Controlled Diet       What Is a Heart Healthy Diet? A heart-healthy diet is one that limits sodium , certain types of fat , and cholesterol . This type of diet is recommended for:   People with any form of cardiovascular disease (eg, coronary heart disease , peripheral vascular disease , previous heart attack , previous stroke )   People with risk factors for cardiovascular disease, such as high blood pressure , high cholesterol , or diabetes   Anyone who wants to lower their risk of developing cardiovascular disease   Sodium    Sodium is a mineral found in many foods. In general, most people consume much more sodium than they need. Diets high in sodium can increase blood pressure and lead to edema (water retention). On a heart-healthy diet, you should consume no more than 2,300 mg (milligrams) of sodium per dayabout the amount in one teaspoon of table salt. The foods highest in sodium include table salt (about 50% sodium), processed foods, convenience foods, and preserved foods. Cholesterol    Cholesterol is a fat-like, waxy substance in your blood. Our bodies make some cholesterol. It is also found in animal products, with the highest amounts in fatty meat, egg yolks, whole milk, cheese, shellfish, and organ meats. On a heart-healthy diet, you should limit your cholesterol intake to less than 200 mg per day. It is normal and important to have some cholesterol in your bloodstream. But too much cholesterol can cause plaque to build up within your arteries, which can eventually lead to a heart attack or stroke. The two types of cholesterol that are most commonly referred to are:   Low-density lipoprotein (LDL) cholesterol  Also known as bad cholesterol, this is the cholesterol that tends to build up along your arteries. Bad cholesterol levels are increased by eating fats that are saturated or hydrogenated. Optimal level of this cholesterol is less than 100.  Over 130 starts to get risky for heart disease. High-density lipoprotein (HDL) cholesterol  Also known as good cholesterol, this type of cholesterol actually carries cholesterol away from your arteries and may, therefore, help lower your risk of having a heart attack. You want this level to be high (ideally greater than 60). It is a risk to have a level less than 40. You can raise this good cholesterol by eating olive oil, canola oil, avocados, or nuts. Exercise raises this level, too. Fat    Fat is calorie dense and packs a lot of calories into a small amount of food. Even though fats should be limited due to their high calorie content, not all fats are bad. In fact, some fats are quite healthful. Fat can be broken down into four main types.    The good-for-you fats are:   Monounsaturated fat  found in oils such as olive and canola, avocados, and nuts and natural nut butters; can decrease cholesterol levels, while keeping levels of HDL cholesterol high   Polyunsaturated fat  found in oils such as safflower, sunflower, soybean, corn, and sesame; can decrease total cholesterol and LDL cholesterol   Omega-3 fatty acids  particularly those found in fatty fish (such as salmon, trout, tuna, mackerel, herring, and sardines); can decrease risk of arrhythmias, decrease triglyceride levels, and slightly lower blood pressure   The fats that you want to limit are:   Saturated fat  found in animal products, many fast foods, and a few vegetables; increases total blood cholesterol, including LDL levels   Animal fats that are saturated include: butter, lard, whole-milk dairy products, meat fat, and poultry skin   Vegetable fats that are saturated include: hydrogenated shortening, palm oil, coconut oil, cocoa butter   Hydrogenated or trans fat  found in margarine and vegetable shortening, most shelf stable snack foods, and fried foods; increases LDL and decreases HDL     It is generally recommended that you limit your total fat for the day to less than 30% of your total calories. If you follow an 1800-calorie heart healthy diet, for example, this would mean 60 grams of fat or less per day. Saturated fat and trans fat in your diet raises your blood cholesterol the most, much more than dietary cholesterol does. For this reason, on a heart-healthy diet, less than 7% of your calories should come from saturated fat and ideally 0% from trans fat. On an 1800-calorie diet, this translates into less than 14 grams of saturated fat per day, leaving 46 grams of fat to come from mono- and polyunsaturated fats.    Food Choices on a Heart Healthy Diet   Food Category   Foods Recommended   Foods to Avoid   Grains   Breads and rolls without salted tops Most dry and cooked cereals Unsalted crackers and breadsticks Low-sodium or homemade breadcrumbs or stuffing All rice and pastas   Breads, rolls, and crackers with salted tops High-fat baked goods (eg, muffins, donuts, pastries) Quick breads, self-rising flour, and biscuit mixes Regular bread crumbs Instant hot cereals Commercially prepared rice, pasta, or stuffing mixes   Vegetables   Most fresh, frozen, and low-sodium canned vegetables Low-sodium and salt-free vegetable juices Canned vegetables if unsalted or rinsed   Regular canned vegetables and juices, including sauerkraut and pickled vegetables Frozen vegetables with sauces Commercially prepared potato and vegetable mixes   Fruits   Most fresh, frozen, and canned fruits All fruit juices   Fruits processed with salt or sodium   Milk   Nonfat or low-fat (1%) milk Nonfat or low-fat yogurt Cottage cheese, low-fat ricotta, cheeses labeled as low-fat and low-sodium   Whole milk Reduced-fat (2%) milk Malted and chocolate milk Full fat yogurt Most cheeses (unless low-fat and low salt) Buttermilk (no more than 1 cup per week)   Meats and Beans   Lean cuts of fresh or frozen beef, veal, lamb, or pork (look for the word loin) Fresh or frozen poultry without the skin Fresh or frozen fish and some shellfish Egg whites and egg substitutes (Limit whole eggs to three per week) Tofu Nuts or seeds (unsalted, dry-roasted), low-sodium peanut butter Dried peas, beans, and lentils   Any smoked, cured, salted, or canned meat, fish, or poultry (including roberts, chipped beef, cold cuts, hot dogs, sausages, sardines, and anchovies) Poultry skins Breaded and/or fried fish or meats Canned peas, beans, and lentils Salted nuts   Fats and Oils   Olive oil and canola oil Low-sodium, low-fat salad dressings and mayonnaise   Butter, margarine, coconut and palm oils, roberts fat   Snacks, Sweets, and Condiments   Low-sodium or unsalted versions of broths, soups, soy sauce, and condiments Pepper, herbs, and spices; vinegar, lemon, or lime juice Low-fat frozen desserts (yogurt, sherbet, fruit bars) Sugar, cocoa powder, honey, syrup, jam, and preserves Low-fat, trans-fat free cookies, cakes, and pies Vipul and animal crackers, fig bars, tereza snaps   High-fat desserts Broth, soups, gravies, and sauces, made from instant mixes or other high-sodium ingredients Salted snack foods Canned olives Meat tenderizers, seasoning salt, and most flavored vinegars   Beverages   Low-sodium carbonated beverages Tea and coffee in moderation Soy milk   Commercially softened water   Suggestions   Make whole grains, fruits, and vegetables the base of your diet. Choose heart-healthy fats such as canola, olive, and flaxseed oil, and foods high in heart-healthy fats, such as nuts, seeds, soybeans, tofu, and fish. Eat fish at least twice per week; the fish highest in omega-3 fatty acids and lowest in mercury include salmon, herring, mackerel, sardines, and canned chunk light tuna. If you eat fish less than twice per week or have high triglycerides, talk to your doctor about taking fish oil supplements. Read food labels.    For products low in fat and cholesterol, look for fat free, low-fat, cholesterol free, saturated fat free, and trans fat freeAlso scan the Nutrition Facts Label, which lists saturated fat, trans fat, and cholesterol amounts. For products low in sodium, look for sodium free, very low sodium, low sodium, no added salt, and unsalted   Skip the salt when cooking or at the table; if food needs more flavor, get creative and try out different herbs and spices. Garlic and onion also add substantial flavor to foods. Trim any visible fat off meat and poultry before cooking, and drain the fat off after ozuna. Use cooking methods that require little or no added fat, such as grilling, boiling, baking, poaching, broiling, roasting, steaming, stir-frying, and sauting. Avoid fast food and convenience food. They tend to be high in saturated and trans fat and have a lot of added salt. Talk to a registered dietitian for individualized diet advice.       Last Reviewed: March 2011 Vivian Spicer MS, MPH, RD   Updated: 3/29/2011   ·

## 2021-08-27 NOTE — PROGRESS NOTES
Medicare Annual Wellness Visit  Name: Sharonda Yeager Date: 2021   MRN: <[id number]> Sex: Female   Age: 78 y.o. Ethnicity: Non- / Non    : 1955 Race: Lloyd Cortez / Heloise Peabody is here for Medicare AWV    Screenings for behavioral, psychosocial and functional/safety risks, and cognitive dysfunction are all negative except as indicated below. These results, as well as other patient data from the [address] form, are documented in Flowsheets linked to this Encounter. Allergies   Allergen Reactions    Aspirin Hives    Bimatoprost Hives    Lac Bovis Hives    Latanoprost Other (See Comments)     Headaches    Lisinopril Swelling    Macadamia Nut Oil      HIVES    Peanut-Containing Drug Products Hives    Salicylates      HIVES         Prior to Visit Medications    Medication Sig Taking?  Authorizing Provider   omalizumab Gloriraymond Avila) 150 MG/ML SOSY injection Inject 300 mg into the skin every 21 days Yes Historical Provider, MD   famotidine (PEPCID) 20 MG tablet Take 1 tablet by mouth 2 times daily Yes Dawson Booker MD   mometasone (ELOCON) 0.1 % cream APPLY TOPICALLY DAILY Yes Ashtyn Penn MD   beclomethasone (QVAR REDIHALER) 80 MCG/ACT AERB inhaler Inhale 1 puff into the lungs 2 times daily Yes Ashtyn Penn MD   amLODIPine (NORVASC) 5 MG tablet Take 1 tablet by mouth daily Yes Ashtyn Penn MD   citalopram (CELEXA) 20 MG tablet Take 1 tablet by mouth daily Yes Ashtyn Penn MD   cetirizine (ZYRTEC) 10 MG tablet Take 1 tablet by mouth 2 times daily Yes Ashtyn Penn MD   montelukast (SINGULAIR) 10 MG tablet Take 1 tablet by mouth daily Yes Ashtyn Penn MD   fluticasone (FLONASE) 50 MCG/ACT nasal spray 1 spray by Each Nostril route daily 1 Spray in each nostril Yes Ashtyn Penn MD   ZIOPTAN 0.0015 % SOLN  Yes Historical Provider, MD   Cholecalciferol (VITAMIN D3) 125 MCG (5000 UT) CAPS Take 1 capsule by mouth three times a week Yes Luis Eduardo Ribera MD   tafluprost (ZIOPTAN) 0.0015 % SOLN ophthalmic drops 1 drop every evening Yes Historical Provider, MD   albuterol sulfate HFA (VENTOLIN HFA) 108 (90 Base) MCG/ACT inhaler INHALE TWO PUFFS BY MOUTH EVERY 6 HOURS AS NEEDED FOR WHEEZING Yes Luis Eduardo Ribera MD   Calcium Carbonate-Vitamin D (CALCIUM 600 + D) 600-400 MG-UNIT TABS Take  by mouth daily. Yes Historical Provider, MD   Multiple Vitamin (MULTIVITAMIN PO) Take  by mouth daily. Yes Historical Provider, MD   EPINEPHrine (EPIPEN) 0.3 MG/0.3ML SOAJ injection Inject 0.3 mLs into the skin once as needed (hives)  Luis Eduardo Ribera MD         Past Medical History:   Diagnosis Date    Allergic rhinitis     Aneurysm ([address] 2/20/2020 7/17/19 at . Complicated by mild cognitive impairment and circadian rhythm disturbanc    Asthma     Gastroesophageal reflux disease without esophagitis 07/10/2019    Hepatitis C, chronic ([address]     Treated in 2011- undetected since then    Hypertension     Seizures ([address]     After head trauma after passing out and hit her head. EEG normal 2013.     Subclinical hypothyroidism 4/7/2021    TMJ (temporomandibular joint syndrome) 12/31/2011    Urticaria, idiopathic        Past Surgical History:   Procedure Laterality Date    CHOLECYSTECTOMY      COLONOSCOPY      EYE SURGERY      lasik    TEMPOROMANDIBULAR JOINT SURGERY Bilateral 1982         Family History   Problem Relation Age of Onset    Arthritis Mother     High Blood Pressure Mother     High Cholesterol Mother     High Blood Pressure Sister        CareTeam (Including outside providers/suppliers regularly involved in providing care):   Patient Care Team:  George Bello MD as PCP - General (Internal Medicine)  George Bello [facility] [address]  as Consulting Physician (Allergy)  Kyler Gonzalez MD as Consulting Physician (Gastroenterology)  Maritza Woodruff Pat as Consulting Physician (Ophthalmology)  Tutu Rod MD as Consulting Physician (Gastroenterology)    Wt Readings from Last 3 Encounters:   08/27/21 171 lb (77.6 kg)   02/26/21 169 lb 12.8 oz (77 kg)   12/24/20 163 lb 3.2 oz (74 kg)     Vitals:    08/27/21 0829   BP: 138/78   Pulse: 71   Resp: 16   SpO2: 98%   Weight: 171 lb (77.6 kg)   Height: 5' 8\" (1.727 m)     Body mass index is 26 kg/m². Based upon direct observation of the patient, evaluation of cognition reveals recent and remote memory intact. Physical Exam  Constitutional:       General: She is not in acute distress. Appearance: She is well-developed. Eyes:      Conjunctiva/sclera: Conjunctivae normal.   Neck:      Thyroid: No thyroid mass or thyromegaly. Cardiovascular:      Rate and Rhythm: Normal rate and regular rhythm. Heart sounds: Normal heart sounds. No murmur heard. No friction rub. No gallop. Pulmonary:      Effort: Pulmonary effort is normal. No respiratory distress. Breath sounds: Normal breath sounds. No wheezing, rhonchi or rales. Musculoskeletal:      Right lower leg: No edema. Left lower leg: No edema. Lymphadenopathy:      Cervical: No cervical adenopathy. Skin:     General: Skin is warm and dry. Findings: No erythema or rash. Neurological:      Mental Status: She is alert and oriented to person, place, and time. Psychiatric:         Speech: Speech normal.         Behavior: Behavior normal.         Thought Content: Thought content normal.         Judgment: Judgment normal.       Patient's complete Health Risk Assessment and screening values have been reviewed and are found in Flowsheets. The following problems were reviewed today and where indicated follow up appointments were made and/or referrals ordered.     Positive Risk Factor Screenings with Interventions:            General Health and ACP:  General  In general, how would you say your health is?: Good  In the past 7 days, have you experienced any of the following?  New or Increased Pain, New or Increased Fatigue, Loneliness, Social Isolation, Stress or Anger?: (!) New or Increased Pain  Do you get the social and emotional support that you need?: Yes  Do you have a Living Will?: (!) No  Advance Directives     Power of  Living Will ACP-Advance Directive ACP-Power of     Not on File Not on File Not on File Not on File      General Health Risk Interventions:  · Pain issues: cramps in calves over the past few weeks- will try stretching, drinking more fluids   · Living Will packet provided- patient will call if needs help complete    Health Habits/Nutrition:  Health Habits/Nutrition  Do you exercise for at least 20 minutes 2-3 times per week?: Yes  Have you lost any weight without trying in the past 3 months?: No  Do you eat only one meal per day?: No  Have you seen the dentist within the past year?: (!) No  Body mass index: (!) 26  Health Habits/Nutrition Interventions:  · Nutritional issues:  educational materials for healthy, well-balanced diet provided  · Dental exam overdue:  patient encouraged to make appointment with his/her dentist after pandemic resolves       Personalized Preventive Plan   Current Health Maintenance Status  Immunization History   Administered Date(s) Administered    COVID-19, Moderna, PF, 100mcg/0.5mL 01/21/2021, 02/18/2021    Hepatitis A 08/22/2011    Hepatitis B 08/22/2011    Influenza A ([id number]) Vaccine PF IM 11/11/2009    Influenza Vaccine, unspecified formulation 09/28/2015, 10/01/2016    Influenza Virus Vaccine 11/28/2011, 10/17/2013    Influenza Whole 10/17/2013, 01/13/2016    Influenza, High Dose (Fluzone 65 yrs and older) 10/11/2019    Influenza, MDCK, Preservative free 09/01/2015    Influenza, Quadv, IM, PF (6 mo and older Fluzone, Flulaval, Fluarix, and 3 yrs and older Afluria) 10/14/2018    Influenza, Quadv, adjuvanted, 65 yrs +, IM, PF (Fluad) 10/15/2020    Pneumococcal Conjugate 13-valent ([id number]) 08/09/2019    Pneumococcal Conjugate Vaccine 11/01/2015    Pneumococcal Polysaccharide ([id number]) 11/18/2015    Zoster Live (Zostavax) 02/28/2016    Zoster Recombinant (Shingrix) 04/05/2019, 09/29/2019        Health Maintenance   Topic Date Due    DTaP/Tdap/Td vaccine (1 - Tdap) Never done    Hepatitis A vaccine (2 of 2 - Risk 2-dose series) 02/22/2012    Pneumococcal 65+ years Vaccine (2 of 2 - PPSV23) 11/18/2020    Breast cancer screen  01/31/2021    Annual Wellness Visit (AWV)  Never done    Hepatitis B vaccine (2 of 3 - Risk 3-dose series) 09/09/2021 (Originally 9/19/2011)    Flu vaccine (1) 09/01/2021    TSH testing  04/07/2022    Potassium monitoring  04/07/2022    Creatinine monitoring  04/07/2022    Colon cancer screen colonoscopy  02/28/2023    DEXA (modify frequency per FRAX score)  04/07/2023    Lipid screen  04/07/2026    COVID-19 Vaccine  Completed    Hepatitis C screen  Completed    Hib vaccine  Aged Out    Meningococcal (ACWY) vaccine  Aged Out     Recommendations for GenY Medium Due: see orders and patient instructions/AVS.  . Recommended screening schedule for the next 5-10 years is provided to the patient in written form: see Patient Instructions/AVS.    Krystal Marsh was seen today for medicare awv. Routine general medical examination at a health care facility  - Hep A #2 and Tdap per pharmacy  - Mammogram completed in April at [facility]- will request report  Need for prophylactic vaccination against Streptococcus pneumoniae (pneumococcus)  -     Pneumococcal polysaccharide vaccine 23-valent PPSV23  Anemia, unspecified type  -     CBC Auto Differential; Future  -     Ferritin; Future  -     Iron and TIBC; Future  -     Lactate Dehydrogenase; Future  -     Haptoglobin; Future  -     Reticulocytes; Future  Subclinical hypothyroidism  -     TSH without Reflex;  Future  -     T4, Free; Future  -     T3, Free; Future  -     Thyroid Peroxidase Antibody; Future  Vitamin D deficiency  -     Vitamin D 25 Hydroxy; Future    Return in about 6 months (around 2/27/2022).

## 2021-10-03 DIAGNOSIS — L50.9 HIVES: ICD-10-CM

## 2021-10-04 RX ORDER — EPINEPHRINE 0.3 MG/.3ML
0.3 INJECTION SUBCUTANEOUS
Qty: 1 EACH | Refills: 5 | Status: SHIPPED | OUTPATIENT
Start: 2021-10-04 | End: 2022-07-11 | Stop reason: SDUPTHER

## 2021-12-26 DIAGNOSIS — K21.9 GASTROESOPHAGEAL REFLUX DISEASE WITHOUT ESOPHAGITIS: ICD-10-CM

## 2021-12-27 RX ORDER — FAMOTIDINE 20 MG/1
20 TABLET, FILM COATED ORAL 2 TIMES DAILY
Qty: 180 TABLET | Refills: 1 | Status: SHIPPED | OUTPATIENT
Start: 2021-12-27 | End: 2022-06-21 | Stop reason: SDUPTHER

## 2021-12-29 ENCOUNTER — PATIENT MESSAGE (OUTPATIENT)
Dept: INTERNAL MEDICINE CLINIC | Age: 66
End: 2021-12-29

## 2021-12-29 DIAGNOSIS — R41.840 ATTENTION AND CONCENTRATION DEFICIT: ICD-10-CM

## 2021-12-29 DIAGNOSIS — I10 ESSENTIAL HYPERTENSION: ICD-10-CM

## 2021-12-29 DIAGNOSIS — J30.89 OTHER ALLERGIC RHINITIS: ICD-10-CM

## 2021-12-29 DIAGNOSIS — J45.40 MODERATE PERSISTENT ASTHMA WITHOUT COMPLICATION: ICD-10-CM

## 2021-12-30 RX ORDER — AMLODIPINE BESYLATE 5 MG/1
5 TABLET ORAL DAILY
Qty: 90 TABLET | Refills: 1 | Status: SHIPPED | OUTPATIENT
Start: 2021-12-30 | End: 2022-06-21 | Stop reason: SDUPTHER

## 2021-12-30 RX ORDER — MONTELUKAST SODIUM 10 MG/1
10 TABLET ORAL DAILY
Qty: 90 TABLET | Refills: 1 | Status: SHIPPED | OUTPATIENT
Start: 2021-12-30 | End: 2022-06-21 | Stop reason: SDUPTHER

## 2021-12-30 RX ORDER — CITALOPRAM 20 MG/1
20 TABLET ORAL DAILY
Qty: 90 TABLET | Refills: 1 | Status: SHIPPED | OUTPATIENT
Start: 2021-12-30 | End: 2022-06-21 | Stop reason: SDUPTHER

## 2021-12-30 RX ORDER — CETIRIZINE HYDROCHLORIDE 10 MG/1
10 TABLET ORAL 2 TIMES DAILY
Qty: 180 TABLET | Refills: 1 | Status: SHIPPED | OUTPATIENT
Start: 2021-12-30 | End: 2022-06-21 | Stop reason: SDUPTHER

## 2021-12-30 NOTE — TELEPHONE ENCOUNTER
Last appointment: 8/27/2021  Next appointment: 2/25/2022  Last refill:   All were a 90 day supply with 3 refills on  12/24/2020

## 2021-12-30 NOTE — TELEPHONE ENCOUNTER
From: Mike Sibley  To: Dr. Oscar Ricks  Sent: 12/29/2021 9:05 PM EST  Subject: Prescription request    The following prescriptions need to be filled. The response in [facility] says the message was routed separately. When they were sent in last year (2020) the request came from Dr Joaquin Sung who is now retired. Dr. Lindsay Bautista is my PCP and I am requesting these prescriptions from her.      Medication renewals requested in this message routed separately:      amLODIPine (NORVASC) 5 MG tablet [BRENT POLANCO MD]      citalopram (CELEXA) 20 MG tablet [BRENT POLANCO MD]      cetirizine (ZYRTEC) 10 MG tablet [BRENT POLANCO MD]      montelukast (SINGULAIR) 10 MG tablet [BRENT Rodriguez MD]    Thank you in advance for your help,    Brent Mcelroy

## 2022-02-24 NOTE — PROGRESS NOTES
palpitations. Antihypertensive medication side effects: no medication side effects noted and swelling in ankles- mild. Use of agents associated with hypertension: none. Subclinical hypothyroidism: Recent symptoms: none. She denies fatigue, weight gain, weight loss, cold intolerance, heat intolerance, hair loss, dry skin, constipation, diarrhea and palpitations. Thyroid peroxidase antibodies positive 8/21. Due for repeat US for thyroid nodule surveillance. Hematology:  Last labs showed borderline Hgb with no evidence of iron deficiency or hemolysis. WBC stable at 2.6, platelets normal.    GERD:  Currently treated with prescription H2 blocker: Pepcid 20 mg, which has been effective. She has been using this therapy 2 times daily. Patient denies dysphagia, cough, hoarseness, chest pain, unintentional weight loss, N/V, bloating, early satiety or change in bowel habits. ROS  As documented above    Physical Exam  Constitutional:       General: She is not in acute distress. Appearance: She is well-developed. Eyes:      Conjunctiva/sclera: Conjunctivae normal.   Neck:      Thyroid: No thyroid mass or thyromegaly. Cardiovascular:      Rate and Rhythm: Normal rate and regular rhythm. Heart sounds: Normal heart sounds. No murmur heard. No friction rub. No gallop. Pulmonary:      Effort: Pulmonary effort is normal. No respiratory distress. Breath sounds: Normal breath sounds. No wheezing, rhonchi or rales. Musculoskeletal:      Right lower leg: Edema (trace-ankle) present. Left lower leg: Edema (trace-ankle) present. Lymphadenopathy:      Cervical: No cervical adenopathy. Skin:     General: Skin is warm and dry. Findings: No erythema or rash. Neurological:      Mental Status: She is alert and oriented to person, place, and time. Psychiatric:         Speech: Speech normal.         Behavior: Behavior normal.         Thought Content:  Thought content normal. Judgment: Judgment normal.           --Negra Dailey MD on 2/25/2022 at 1:50 PM    An electronic signature was used to authenticate this note.

## 2022-02-25 ENCOUNTER — OFFICE VISIT (OUTPATIENT)
Dept: INTERNAL MEDICINE CLINIC | Age: 67
End: 2022-02-25
Payer: MEDICARE

## 2022-02-25 VITALS
RESPIRATION RATE: 16 BRPM | DIASTOLIC BLOOD PRESSURE: 78 MMHG | OXYGEN SATURATION: 98 % | HEART RATE: 73 BPM | WEIGHT: 175 LBS | SYSTOLIC BLOOD PRESSURE: 132 MMHG | HEIGHT: 68 IN | BODY MASS INDEX: 26.52 KG/M2

## 2022-02-25 DIAGNOSIS — E03.8 SUBCLINICAL HYPOTHYROIDISM: ICD-10-CM

## 2022-02-25 DIAGNOSIS — K21.9 GASTROESOPHAGEAL REFLUX DISEASE WITHOUT ESOPHAGITIS: ICD-10-CM

## 2022-02-25 DIAGNOSIS — E04.1 THYROID NODULE: ICD-10-CM

## 2022-02-25 DIAGNOSIS — I10 ESSENTIAL HYPERTENSION: Primary | ICD-10-CM

## 2022-02-25 DIAGNOSIS — D70.8 OTHER NEUTROPENIA (HCC): ICD-10-CM

## 2022-02-25 DIAGNOSIS — D64.9 ANEMIA, UNSPECIFIED TYPE: ICD-10-CM

## 2022-02-25 PROCEDURE — 99214 OFFICE O/P EST MOD 30 MIN: CPT | Performed by: INTERNAL MEDICINE

## 2022-02-26 ENCOUNTER — TELEPHONE (OUTPATIENT)
Dept: INTERNAL MEDICINE CLINIC | Age: 67
End: 2022-02-26

## 2022-03-03 ENCOUNTER — TELEPHONE (OUTPATIENT)
Dept: INTERNAL MEDICINE CLINIC | Age: 67
End: 2022-03-03

## 2022-03-03 NOTE — TELEPHONE ENCOUNTER
Patient is returning a call from [facility] about lab results. Please call her at number provided to discuss.

## 2022-03-03 NOTE — TELEPHONE ENCOUNTER
Contacted patient and read Dr. Ana Lopez lab results note to her. Patient acknowledges recommendation to follow up with GI physicians.

## 2022-06-21 ENCOUNTER — PATIENT MESSAGE (OUTPATIENT)
Dept: INTERNAL MEDICINE CLINIC | Age: 67
End: 2022-06-21

## 2022-06-21 DIAGNOSIS — K21.9 GASTROESOPHAGEAL REFLUX DISEASE WITHOUT ESOPHAGITIS: ICD-10-CM

## 2022-06-21 DIAGNOSIS — J30.89 OTHER ALLERGIC RHINITIS: ICD-10-CM

## 2022-06-21 DIAGNOSIS — I10 ESSENTIAL HYPERTENSION: ICD-10-CM

## 2022-06-21 DIAGNOSIS — R41.840 ATTENTION AND CONCENTRATION DEFICIT: ICD-10-CM

## 2022-06-21 DIAGNOSIS — J45.40 MODERATE PERSISTENT ASTHMA WITHOUT COMPLICATION: ICD-10-CM

## 2022-06-21 RX ORDER — MONTELUKAST SODIUM 10 MG/1
TABLET ORAL
Qty: 90 TABLET | Refills: 0 | Status: SHIPPED | OUTPATIENT
Start: 2022-06-21

## 2022-06-21 RX ORDER — AMLODIPINE BESYLATE 5 MG/1
TABLET ORAL
Qty: 90 TABLET | Refills: 0 | Status: SHIPPED | OUTPATIENT
Start: 2022-06-21 | End: 2022-06-27 | Stop reason: SDUPTHER

## 2022-06-21 RX ORDER — FAMOTIDINE 20 MG/1
20 TABLET, FILM COATED ORAL 2 TIMES DAILY
Qty: 30 TABLET | Refills: 0 | Status: SHIPPED | OUTPATIENT
Start: 2022-06-21

## 2022-06-21 RX ORDER — CITALOPRAM 20 MG/1
20 TABLET ORAL DAILY
Qty: 30 TABLET | Refills: 0 | Status: SHIPPED | OUTPATIENT
Start: 2022-06-21 | End: 2022-06-27 | Stop reason: SDUPTHER

## 2022-06-21 RX ORDER — MONTELUKAST SODIUM 10 MG/1
10 TABLET ORAL DAILY
Qty: 30 TABLET | Refills: 0 | Status: SHIPPED | OUTPATIENT
Start: 2022-06-21 | End: 2022-06-27 | Stop reason: SDUPTHER

## 2022-06-21 RX ORDER — FAMOTIDINE 20 MG/1
TABLET, FILM COATED ORAL
Qty: 180 TABLET | Refills: 0 | Status: SHIPPED | OUTPATIENT
Start: 2022-06-21 | End: 2022-06-27 | Stop reason: SDUPTHER

## 2022-06-21 RX ORDER — CETIRIZINE HYDROCHLORIDE 10 MG/1
10 TABLET ORAL 2 TIMES DAILY
Qty: 60 TABLET | Refills: 0 | Status: SHIPPED | OUTPATIENT
Start: 2022-06-21

## 2022-06-21 RX ORDER — AMLODIPINE BESYLATE 5 MG/1
5 TABLET ORAL DAILY
Qty: 30 TABLET | Refills: 0 | Status: SHIPPED | OUTPATIENT
Start: 2022-06-21

## 2022-06-21 RX ORDER — CETIRIZINE HYDROCHLORIDE 10 MG/1
TABLET ORAL
Qty: 180 TABLET | Refills: 0 | Status: SHIPPED | OUTPATIENT
Start: 2022-06-21 | End: 2022-06-27 | Stop reason: SDUPTHER

## 2022-06-21 RX ORDER — CITALOPRAM 20 MG/1
TABLET ORAL
Qty: 90 TABLET | Refills: 0 | Status: SHIPPED | OUTPATIENT
Start: 2022-06-21

## 2022-06-21 NOTE — TELEPHONE ENCOUNTER
From: Sandra Rivera  To: Dr. Child Reap: 6/21/2022 12:48 AM EDT  Subject: Prescription Refills    I am requesting 90 day refills of the following medications please:    famotidine (PEPCID) 20 MG tablet twice a day    amLODIPine (NORVASC) 5 MG tablet once a day      citalopram (CELEXA) 20 MG tablet once a day      cetirizine (ZYRTEC) 10 MG tablet twice a day      montelukast (SINGULAIR) 10 MG tablet once a day     Preferred pharmacy: [address], [address] [place] [address] [phone]    Thank you in advance,     You Ear

## 2022-06-21 NOTE — TELEPHONE ENCOUNTER
Last appointment: 2/25/2022  Next appointment: 8/26/2022  Last refill: 12/30/21  Please advise as DOD.  Thank you

## 2022-06-27 RX ORDER — CETIRIZINE HYDROCHLORIDE 10 MG/1
TABLET ORAL
Qty: 180 TABLET | Refills: 3 | Status: SHIPPED | OUTPATIENT
Start: 2022-06-27 | End: 2022-07-11 | Stop reason: SDUPTHER

## 2022-06-27 RX ORDER — AMLODIPINE BESYLATE 5 MG/1
TABLET ORAL
Qty: 90 TABLET | Refills: 1 | Status: SHIPPED | OUTPATIENT
Start: 2022-06-27 | End: 2022-07-11 | Stop reason: SDUPTHER

## 2022-06-27 RX ORDER — MONTELUKAST SODIUM 10 MG/1
10 TABLET ORAL DAILY
Qty: 90 TABLET | Refills: 1 | Status: SHIPPED | OUTPATIENT
Start: 2022-06-27 | End: 2022-07-11 | Stop reason: SDUPTHER

## 2022-06-27 RX ORDER — CITALOPRAM 20 MG/1
20 TABLET ORAL DAILY
Qty: 90 TABLET | Refills: 1 | Status: SHIPPED | OUTPATIENT
Start: 2022-06-27 | End: 2022-07-11 | Stop reason: SDUPTHER

## 2022-06-27 RX ORDER — FAMOTIDINE 20 MG/1
TABLET, FILM COATED ORAL
Qty: 180 TABLET | Refills: 1 | Status: SHIPPED | OUTPATIENT
Start: 2022-06-27 | End: 2022-07-11 | Stop reason: SDUPTHER

## 2022-11-08 ENCOUNTER — TELEPHONE (OUTPATIENT)
Dept: INTERNAL MEDICINE CLINIC | Age: 67
End: 2022-11-08

## 2022-11-08 NOTE — TELEPHONE ENCOUNTER
Spoke with patient. She states she will call back to schedule another day.     ----- Message from Mayank Goodman MA sent at 11/8/2022 11:19 AM EST -----  Return in about 6 months (around 8/25/2022) for MEDICARE AWV.